# Patient Record
Sex: MALE | Race: WHITE | NOT HISPANIC OR LATINO | Employment: OTHER | URBAN - NONMETROPOLITAN AREA
[De-identification: names, ages, dates, MRNs, and addresses within clinical notes are randomized per-mention and may not be internally consistent; named-entity substitution may affect disease eponyms.]

---

## 2021-01-21 ENCOUNTER — TRANSCRIBE ORDERS (OUTPATIENT)
Dept: ADMINISTRATIVE | Facility: HOSPITAL | Age: 71
End: 2021-01-21

## 2021-01-21 DIAGNOSIS — Z85.72 PERSONAL HISTORY OF LYMPHOMA: Primary | ICD-10-CM

## 2021-01-22 ENCOUNTER — HOSPITAL ENCOUNTER (OUTPATIENT)
Dept: CT IMAGING | Facility: HOSPITAL | Age: 71
Discharge: HOME | End: 2021-01-22
Admitting: PHYSICIAN ASSISTANT

## 2021-01-22 DIAGNOSIS — Z85.72 PERSONAL HISTORY OF LYMPHOMA: ICD-10-CM

## 2021-01-22 LAB — CREAT BLDA-MCNC: 0.7 MG/DL (ref 0.6–1.3)

## 2021-01-22 PROCEDURE — 25010000002 IOPAMIDOL 61 % SOLUTION: Performed by: PHYSICIAN ASSISTANT

## 2021-01-22 PROCEDURE — 82565 ASSAY OF CREATININE: CPT

## 2021-01-22 PROCEDURE — 71270 CT THORAX DX C-/C+: CPT | Performed by: RADIOLOGY

## 2021-01-22 PROCEDURE — 71270 CT THORAX DX C-/C+: CPT

## 2021-01-22 RX ADMIN — IOPAMIDOL 80 ML: 612 INJECTION, SOLUTION INTRAVENOUS at 10:54

## 2021-01-28 ENCOUNTER — TRANSCRIBE ORDERS (OUTPATIENT)
Dept: ADMINISTRATIVE | Facility: HOSPITAL | Age: 71
End: 2021-01-28

## 2021-01-28 DIAGNOSIS — K86.89 PANCREATIC MASS: Primary | ICD-10-CM

## 2021-02-01 ENCOUNTER — HOSPITAL ENCOUNTER (OUTPATIENT)
Dept: CT IMAGING | Facility: HOSPITAL | Age: 71
Discharge: HOME | End: 2021-02-01
Admitting: PHYSICIAN ASSISTANT

## 2021-02-01 DIAGNOSIS — K86.89 PANCREATIC MASS: ICD-10-CM

## 2021-02-01 PROCEDURE — 74178 CT ABD&PLV WO CNTR FLWD CNTR: CPT | Performed by: RADIOLOGY

## 2021-02-01 PROCEDURE — 25010000002 IOPAMIDOL 61 % SOLUTION: Performed by: PHYSICIAN ASSISTANT

## 2021-02-01 PROCEDURE — 74178 CT ABD&PLV WO CNTR FLWD CNTR: CPT

## 2021-02-01 RX ADMIN — IOPAMIDOL 100 ML: 612 INJECTION, SOLUTION INTRAVENOUS at 12:55

## 2021-10-19 ENCOUNTER — TRANSCRIBE ORDERS (OUTPATIENT)
Dept: ADMINISTRATIVE | Facility: HOSPITAL | Age: 71
End: 2021-10-19

## 2021-10-19 DIAGNOSIS — Z01.818 OTHER SPECIFIED PRE-OPERATIVE EXAMINATION: Primary | ICD-10-CM

## 2021-10-22 ENCOUNTER — LAB (OUTPATIENT)
Dept: LAB | Facility: HOSPITAL | Age: 71
End: 2021-10-22

## 2021-10-22 DIAGNOSIS — Z01.818 OTHER SPECIFIED PRE-OPERATIVE EXAMINATION: ICD-10-CM

## 2021-10-22 LAB — SARS-COV-2 RNA PNL SPEC NAA+PROBE: NOT DETECTED

## 2021-10-22 PROCEDURE — C9803 HOPD COVID-19 SPEC COLLECT: HCPCS

## 2021-10-22 PROCEDURE — U0004 COV-19 TEST NON-CDC HGH THRU: HCPCS | Performed by: OPHTHALMOLOGY

## 2021-10-22 PROCEDURE — U0005 INFEC AGEN DETEC AMPLI PROBE: HCPCS | Performed by: OPHTHALMOLOGY

## 2021-12-18 ENCOUNTER — APPOINTMENT (OUTPATIENT)
Dept: GENERAL RADIOLOGY | Facility: HOSPITAL | Age: 71
End: 2021-12-18

## 2021-12-18 ENCOUNTER — APPOINTMENT (OUTPATIENT)
Dept: CT IMAGING | Facility: HOSPITAL | Age: 71
End: 2021-12-18

## 2021-12-18 ENCOUNTER — HOSPITAL ENCOUNTER (INPATIENT)
Facility: HOSPITAL | Age: 71
LOS: 3 days | Discharge: HOME | End: 2021-12-22
Attending: STUDENT IN AN ORGANIZED HEALTH CARE EDUCATION/TRAINING PROGRAM | Admitting: INTERNAL MEDICINE

## 2021-12-18 DIAGNOSIS — U07.1 COVID-19: Primary | ICD-10-CM

## 2021-12-18 DIAGNOSIS — R09.02 HYPOXIA: ICD-10-CM

## 2021-12-18 LAB
A-A DO2: 58.6 MMHG (ref 0–300)
A-A DO2: 85.6 MMHG (ref 0–300)
ALBUMIN SERPL-MCNC: 3.6 G/DL (ref 3.5–5.2)
ALBUMIN/GLOB SERPL: 1.2 G/DL
ALP SERPL-CCNC: 77 U/L (ref 39–117)
ALT SERPL W P-5'-P-CCNC: 24 U/L (ref 1–41)
ANION GAP SERPL CALCULATED.3IONS-SCNC: 10.6 MMOL/L (ref 5–15)
ARTERIAL PATENCY WRIST A: ABNORMAL
ARTERIAL PATENCY WRIST A: POSITIVE
AST SERPL-CCNC: 28 U/L (ref 1–40)
ATMOSPHERIC PRESS: 727 MMHG
ATMOSPHERIC PRESS: 727 MMHG
B PARAPERT DNA SPEC QL NAA+PROBE: NOT DETECTED
B PERT DNA SPEC QL NAA+PROBE: NOT DETECTED
BASE EXCESS BLDA CALC-SCNC: 1.3 MMOL/L (ref 0–2)
BASE EXCESS BLDA CALC-SCNC: 1.6 MMOL/L (ref 0–2)
BASOPHILS # BLD AUTO: 0.01 10*3/MM3 (ref 0–0.2)
BASOPHILS NFR BLD AUTO: 0.2 % (ref 0–1.5)
BDY SITE: ABNORMAL
BDY SITE: ABNORMAL
BILIRUB SERPL-MCNC: 0.5 MG/DL (ref 0–1.2)
BODY TEMPERATURE: 0 C
BODY TEMPERATURE: 0 C
BUN SERPL-MCNC: 11 MG/DL (ref 8–23)
BUN/CREAT SERPL: 12.9 (ref 7–25)
C PNEUM DNA NPH QL NAA+NON-PROBE: NOT DETECTED
CALCIUM SPEC-SCNC: 9.2 MG/DL (ref 8.6–10.5)
CHLORIDE SERPL-SCNC: 100 MMOL/L (ref 98–107)
CO2 BLDA-SCNC: 24.6 MMOL/L (ref 22–33)
CO2 BLDA-SCNC: 24.7 MMOL/L (ref 22–33)
CO2 SERPL-SCNC: 24.4 MMOL/L (ref 22–29)
COHGB MFR BLD: <0.2 % (ref 0–5)
COHGB MFR BLD: <0.2 % (ref 0–5)
CREAT SERPL-MCNC: 0.85 MG/DL (ref 0.76–1.27)
CRP SERPL-MCNC: 5.48 MG/DL (ref 0–0.5)
D DIMER PPP FEU-MCNC: 1.72 MCGFEU/ML (ref 0–0.5)
D-LACTATE SERPL-SCNC: 1.2 MMOL/L (ref 0.5–2)
DEPRECATED RDW RBC AUTO: 46.9 FL (ref 37–54)
EOSINOPHIL # BLD AUTO: 0.01 10*3/MM3 (ref 0–0.4)
EOSINOPHIL NFR BLD AUTO: 0.2 % (ref 0.3–6.2)
ERYTHROCYTE [DISTWIDTH] IN BLOOD BY AUTOMATED COUNT: 14 % (ref 12.3–15.4)
FERRITIN SERPL-MCNC: 2389 NG/ML (ref 30–400)
FLUAV SUBTYP SPEC NAA+PROBE: NOT DETECTED
FLUBV RNA ISLT QL NAA+PROBE: NOT DETECTED
GAS FLOW AIRWAY: 2 LPM
GFR SERPL CREATININE-BSD FRML MDRD: 89 ML/MIN/1.73
GLOBULIN UR ELPH-MCNC: 3.1 GM/DL
GLUCOSE SERPL-MCNC: 134 MG/DL (ref 65–99)
HADV DNA SPEC NAA+PROBE: NOT DETECTED
HCO3 BLDA-SCNC: 23.6 MMOL/L (ref 20–26)
HCO3 BLDA-SCNC: 23.8 MMOL/L (ref 20–26)
HCOV 229E RNA SPEC QL NAA+PROBE: NOT DETECTED
HCOV HKU1 RNA SPEC QL NAA+PROBE: NOT DETECTED
HCOV NL63 RNA SPEC QL NAA+PROBE: NOT DETECTED
HCOV OC43 RNA SPEC QL NAA+PROBE: NOT DETECTED
HCT VFR BLD AUTO: 44.1 % (ref 37.5–51)
HCT VFR BLD CALC: 43.4 % (ref 38–51)
HCT VFR BLD CALC: 44.6 % (ref 38–51)
HGB BLD-MCNC: 14.3 G/DL (ref 13–17.7)
HGB BLDA-MCNC: 14.2 G/DL (ref 14–18)
HGB BLDA-MCNC: 14.6 G/DL (ref 14–18)
HMPV RNA NPH QL NAA+NON-PROBE: NOT DETECTED
HOLD SPECIMEN: NORMAL
HOLD SPECIMEN: NORMAL
HPIV1 RNA ISLT QL NAA+PROBE: NOT DETECTED
HPIV2 RNA SPEC QL NAA+PROBE: NOT DETECTED
HPIV3 RNA NPH QL NAA+PROBE: NOT DETECTED
HPIV4 P GENE NPH QL NAA+PROBE: NOT DETECTED
IMM GRANULOCYTES # BLD AUTO: 0.01 10*3/MM3 (ref 0–0.05)
IMM GRANULOCYTES NFR BLD AUTO: 0.2 % (ref 0–0.5)
INHALED O2 CONCENTRATION: 21 %
INHALED O2 CONCENTRATION: 28 %
LDH SERPL-CCNC: 308 U/L (ref 135–225)
LYMPHOCYTES # BLD AUTO: 0.89 10*3/MM3 (ref 0.7–3.1)
LYMPHOCYTES NFR BLD AUTO: 21.4 % (ref 19.6–45.3)
Lab: ABNORMAL
M PNEUMO IGG SER IA-ACNC: NOT DETECTED
MCH RBC QN AUTO: 29.5 PG (ref 26.6–33)
MCHC RBC AUTO-ENTMCNC: 32.4 G/DL (ref 31.5–35.7)
MCV RBC AUTO: 90.9 FL (ref 79–97)
METHGB BLD QL: 0 % (ref 0–3)
METHGB BLD QL: 0.1 % (ref 0–3)
MODALITY: ABNORMAL
MODALITY: ABNORMAL
MONOCYTES # BLD AUTO: 0.4 10*3/MM3 (ref 0.1–0.9)
MONOCYTES NFR BLD AUTO: 9.6 % (ref 5–12)
NEUTROPHILS NFR BLD AUTO: 2.83 10*3/MM3 (ref 1.7–7)
NEUTROPHILS NFR BLD AUTO: 68.4 % (ref 42.7–76)
NOTE: ABNORMAL
NOTE: ABNORMAL
NOTIFIED BY: ABNORMAL
NOTIFIED WHO: ABNORMAL
NRBC BLD AUTO-RTO: 0 /100 WBC (ref 0–0.2)
NT-PROBNP SERPL-MCNC: 587 PG/ML (ref 0–900)
OXYHGB MFR BLDV: 86.3 % (ref 94–99)
OXYHGB MFR BLDV: 93.1 % (ref 94–99)
PCO2 BLDA: 30 MM HG (ref 35–45)
PCO2 BLDA: 30.4 MM HG (ref 35–45)
PCO2 TEMP ADJ BLD: ABNORMAL MM[HG]
PCO2 TEMP ADJ BLD: ABNORMAL MM[HG]
PH BLDA: 7.5 PH UNITS (ref 7.35–7.45)
PH BLDA: 7.51 PH UNITS (ref 7.35–7.45)
PH, TEMP CORRECTED: ABNORMAL
PH, TEMP CORRECTED: ABNORMAL
PLATELET # BLD AUTO: 143 10*3/MM3 (ref 140–450)
PMV BLD AUTO: 9.6 FL (ref 6–12)
PO2 BLDA: 49.8 MM HG (ref 83–108)
PO2 BLDA: 71.4 MM HG (ref 83–108)
PO2 TEMP ADJ BLD: ABNORMAL MM[HG]
PO2 TEMP ADJ BLD: ABNORMAL MM[HG]
POTASSIUM SERPL-SCNC: 4.1 MMOL/L (ref 3.5–5.2)
PROT SERPL-MCNC: 6.7 G/DL (ref 6–8.5)
RBC # BLD AUTO: 4.85 10*6/MM3 (ref 4.14–5.8)
RHINOVIRUS RNA SPEC NAA+PROBE: NOT DETECTED
RSV RNA NPH QL NAA+NON-PROBE: NOT DETECTED
SAO2 % BLDCOA: 86 % (ref 94–99)
SAO2 % BLDCOA: 92.8 % (ref 94–99)
SARS-COV-2 RNA NPH QL NAA+NON-PROBE: DETECTED
SODIUM SERPL-SCNC: 135 MMOL/L (ref 136–145)
TROPONIN T SERPL-MCNC: <0.01 NG/ML (ref 0–0.03)
VENTILATOR MODE: ABNORMAL
VENTILATOR MODE: ABNORMAL
WBC NRBC COR # BLD: 4.15 10*3/MM3 (ref 3.4–10.8)
WHOLE BLOOD HOLD SPECIMEN: NORMAL
WHOLE BLOOD HOLD SPECIMEN: NORMAL

## 2021-12-18 PROCEDURE — 36600 WITHDRAWAL OF ARTERIAL BLOOD: CPT

## 2021-12-18 PROCEDURE — 25010000002 DEXAMETHASONE PER 1 MG: Performed by: NURSE PRACTITIONER

## 2021-12-18 PROCEDURE — 82728 ASSAY OF FERRITIN: CPT | Performed by: PHYSICIAN ASSISTANT

## 2021-12-18 PROCEDURE — 36415 COLL VENOUS BLD VENIPUNCTURE: CPT

## 2021-12-18 PROCEDURE — 87040 BLOOD CULTURE FOR BACTERIA: CPT | Performed by: PHYSICIAN ASSISTANT

## 2021-12-18 PROCEDURE — 85025 COMPLETE CBC W/AUTO DIFF WBC: CPT | Performed by: PHYSICIAN ASSISTANT

## 2021-12-18 PROCEDURE — 71275 CT ANGIOGRAPHY CHEST: CPT

## 2021-12-18 PROCEDURE — 80053 COMPREHEN METABOLIC PANEL: CPT | Performed by: PHYSICIAN ASSISTANT

## 2021-12-18 PROCEDURE — 83050 HGB METHEMOGLOBIN QUAN: CPT

## 2021-12-18 PROCEDURE — 82805 BLOOD GASES W/O2 SATURATION: CPT

## 2021-12-18 PROCEDURE — 93005 ELECTROCARDIOGRAM TRACING: CPT | Performed by: PHYSICIAN ASSISTANT

## 2021-12-18 PROCEDURE — 71045 X-RAY EXAM CHEST 1 VIEW: CPT

## 2021-12-18 PROCEDURE — 0 IOVERSOL 68 % SOLUTION: Performed by: EMERGENCY MEDICINE

## 2021-12-18 PROCEDURE — 84145 PROCALCITONIN (PCT): CPT | Performed by: PHYSICIAN ASSISTANT

## 2021-12-18 PROCEDURE — 85379 FIBRIN DEGRADATION QUANT: CPT | Performed by: PHYSICIAN ASSISTANT

## 2021-12-18 PROCEDURE — 82375 ASSAY CARBOXYHB QUANT: CPT

## 2021-12-18 PROCEDURE — 99284 EMERGENCY DEPT VISIT MOD MDM: CPT

## 2021-12-18 PROCEDURE — 83880 ASSAY OF NATRIURETIC PEPTIDE: CPT | Performed by: PHYSICIAN ASSISTANT

## 2021-12-18 PROCEDURE — 83615 LACTATE (LD) (LDH) ENZYME: CPT | Performed by: PHYSICIAN ASSISTANT

## 2021-12-18 PROCEDURE — 93010 ELECTROCARDIOGRAM REPORT: CPT | Performed by: SPECIALIST

## 2021-12-18 PROCEDURE — 86140 C-REACTIVE PROTEIN: CPT | Performed by: PHYSICIAN ASSISTANT

## 2021-12-18 PROCEDURE — 0202U NFCT DS 22 TRGT SARS-COV-2: CPT | Performed by: PHYSICIAN ASSISTANT

## 2021-12-18 PROCEDURE — 84484 ASSAY OF TROPONIN QUANT: CPT | Performed by: PHYSICIAN ASSISTANT

## 2021-12-18 PROCEDURE — 83036 HEMOGLOBIN GLYCOSYLATED A1C: CPT | Performed by: INTERNAL MEDICINE

## 2021-12-18 PROCEDURE — 84443 ASSAY THYROID STIM HORMONE: CPT | Performed by: INTERNAL MEDICINE

## 2021-12-18 PROCEDURE — 83605 ASSAY OF LACTIC ACID: CPT | Performed by: PHYSICIAN ASSISTANT

## 2021-12-18 RX ORDER — DEXAMETHASONE SODIUM PHOSPHATE 10 MG/ML
10 INJECTION INTRAMUSCULAR; INTRAVENOUS ONCE
Status: COMPLETED | OUTPATIENT
Start: 2021-12-18 | End: 2021-12-18

## 2021-12-18 RX ADMIN — IOVERSOL 70 ML: 678 INJECTION INTRA-ARTERIAL; INTRAVENOUS at 21:49

## 2021-12-18 RX ADMIN — DEXAMETHASONE SODIUM PHOSPHATE 10 MG: 10 INJECTION INTRAMUSCULAR; INTRAVENOUS at 21:22

## 2021-12-19 PROBLEM — U07.1 COVID-19: Status: ACTIVE | Noted: 2021-12-19

## 2021-12-19 LAB
ALBUMIN SERPL-MCNC: 3.2 G/DL (ref 3.5–5.2)
ALP SERPL-CCNC: 72 U/L (ref 39–117)
ALT SERPL W P-5'-P-CCNC: 18 U/L (ref 1–41)
AST SERPL-CCNC: 22 U/L (ref 1–40)
BILIRUB CONJ SERPL-MCNC: 0.2 MG/DL (ref 0–0.3)
BILIRUB INDIRECT SERPL-MCNC: 0.2 MG/DL
BILIRUB SERPL-MCNC: 0.4 MG/DL (ref 0–1.2)
CREAT SERPL-MCNC: 0.88 MG/DL (ref 0.76–1.27)
GFR SERPL CREATININE-BSD FRML MDRD: 85 ML/MIN/1.73
GLUCOSE BLDC GLUCOMTR-MCNC: 216 MG/DL (ref 70–130)
GLUCOSE BLDC GLUCOMTR-MCNC: 222 MG/DL (ref 70–130)
GLUCOSE BLDC GLUCOMTR-MCNC: 230 MG/DL (ref 70–130)
HBA1C MFR BLD: 6.7 % (ref 4.8–5.6)
PROCALCITONIN SERPL-MCNC: 0.09 NG/ML (ref 0–0.25)
PROT SERPL-MCNC: 6.2 G/DL (ref 6–8.5)
QT INTERVAL: 392 MS
QTC INTERVAL: 455 MS
TSH SERPL DL<=0.05 MIU/L-ACNC: 1.97 UIU/ML (ref 0.27–4.2)

## 2021-12-19 PROCEDURE — 82565 ASSAY OF CREATININE: CPT | Performed by: INTERNAL MEDICINE

## 2021-12-19 PROCEDURE — 25010000002 ENOXAPARIN PER 10 MG: Performed by: INTERNAL MEDICINE

## 2021-12-19 PROCEDURE — 80076 HEPATIC FUNCTION PANEL: CPT | Performed by: INTERNAL MEDICINE

## 2021-12-19 PROCEDURE — 82962 GLUCOSE BLOOD TEST: CPT

## 2021-12-19 PROCEDURE — 63710000001 DEXAMETHASONE PER 0.25 MG: Performed by: INTERNAL MEDICINE

## 2021-12-19 PROCEDURE — 99223 1ST HOSP IP/OBS HIGH 75: CPT | Performed by: INTERNAL MEDICINE

## 2021-12-19 PROCEDURE — XW033E5 INTRODUCTION OF REMDESIVIR ANTI-INFECTIVE INTO PERIPHERAL VEIN, PERCUTANEOUS APPROACH, NEW TECHNOLOGY GROUP 5: ICD-10-PCS | Performed by: INTERNAL MEDICINE

## 2021-12-19 RX ORDER — SODIUM CHLORIDE 0.9 % (FLUSH) 0.9 %
3-10 SYRINGE (ML) INJECTION AS NEEDED
Status: DISCONTINUED | OUTPATIENT
Start: 2021-12-19 | End: 2021-12-22 | Stop reason: HOSPADM

## 2021-12-19 RX ORDER — NIFEDIPINE 60 MG/1
60 TABLET, FILM COATED, EXTENDED RELEASE ORAL DAILY
COMMUNITY

## 2021-12-19 RX ORDER — ALPRAZOLAM 1 MG/1
1 TABLET ORAL 3 TIMES DAILY PRN
Status: DISCONTINUED | OUTPATIENT
Start: 2021-12-19 | End: 2021-12-22 | Stop reason: HOSPADM

## 2021-12-19 RX ORDER — EZETIMIBE 10 MG/1
10 TABLET ORAL DAILY
COMMUNITY

## 2021-12-19 RX ORDER — SODIUM CHLORIDE 0.9 % (FLUSH) 0.9 %
3 SYRINGE (ML) INJECTION EVERY 12 HOURS SCHEDULED
Status: DISCONTINUED | OUTPATIENT
Start: 2021-12-19 | End: 2021-12-22 | Stop reason: HOSPADM

## 2021-12-19 RX ORDER — ATENOLOL 25 MG/1
25 TABLET ORAL DAILY
COMMUNITY

## 2021-12-19 RX ORDER — ASPIRIN 81 MG/1
81 TABLET ORAL DAILY
Status: DISCONTINUED | OUTPATIENT
Start: 2021-12-19 | End: 2021-12-22 | Stop reason: HOSPADM

## 2021-12-19 RX ORDER — DEXAMETHASONE 4 MG/1
6 TABLET ORAL DAILY
Status: DISCONTINUED | OUTPATIENT
Start: 2021-12-19 | End: 2021-12-22 | Stop reason: HOSPADM

## 2021-12-19 RX ORDER — PANTOPRAZOLE SODIUM 40 MG/1
40 TABLET, DELAYED RELEASE ORAL 2 TIMES DAILY
COMMUNITY

## 2021-12-19 RX ORDER — DEXAMETHASONE SODIUM PHOSPHATE 4 MG/ML
6 INJECTION, SOLUTION INTRA-ARTICULAR; INTRALESIONAL; INTRAMUSCULAR; INTRAVENOUS; SOFT TISSUE DAILY
Status: DISCONTINUED | OUTPATIENT
Start: 2021-12-19 | End: 2021-12-22 | Stop reason: HOSPADM

## 2021-12-19 RX ORDER — ASPIRIN 81 MG/1
81 TABLET ORAL DAILY
COMMUNITY

## 2021-12-19 RX ORDER — ATENOLOL 25 MG/1
25 TABLET ORAL DAILY
Status: DISCONTINUED | OUTPATIENT
Start: 2021-12-19 | End: 2021-12-22 | Stop reason: HOSPADM

## 2021-12-19 RX ORDER — AZITHROMYCIN 250 MG/1
250 TABLET, FILM COATED ORAL DAILY
COMMUNITY
End: 2021-12-22 | Stop reason: HOSPADM

## 2021-12-19 RX ORDER — NICOTINE POLACRILEX 4 MG
15 LOZENGE BUCCAL
Status: DISCONTINUED | OUTPATIENT
Start: 2021-12-19 | End: 2021-12-22 | Stop reason: HOSPADM

## 2021-12-19 RX ORDER — NITROGLYCERIN 0.4 MG/1
0.4 TABLET SUBLINGUAL
Status: DISCONTINUED | OUTPATIENT
Start: 2021-12-19 | End: 2021-12-22 | Stop reason: HOSPADM

## 2021-12-19 RX ORDER — PANTOPRAZOLE SODIUM 40 MG/1
40 TABLET, DELAYED RELEASE ORAL 2 TIMES DAILY
Status: DISCONTINUED | OUTPATIENT
Start: 2021-12-19 | End: 2021-12-22 | Stop reason: HOSPADM

## 2021-12-19 RX ORDER — AZITHROMYCIN 250 MG/1
250 TABLET, FILM COATED ORAL DAILY
Status: CANCELLED | OUTPATIENT
Start: 2021-12-19 | End: 2021-12-21

## 2021-12-19 RX ORDER — ONDANSETRON 4 MG/1
4 TABLET, FILM COATED ORAL 3 TIMES DAILY PRN
Status: DISCONTINUED | OUTPATIENT
Start: 2021-12-19 | End: 2021-12-22 | Stop reason: HOSPADM

## 2021-12-19 RX ORDER — ONDANSETRON 4 MG/1
4 TABLET, FILM COATED ORAL 3 TIMES DAILY PRN
COMMUNITY
End: 2021-12-22 | Stop reason: HOSPADM

## 2021-12-19 RX ORDER — DEXTROSE MONOHYDRATE 25 G/50ML
25 INJECTION, SOLUTION INTRAVENOUS
Status: DISCONTINUED | OUTPATIENT
Start: 2021-12-19 | End: 2021-12-22 | Stop reason: HOSPADM

## 2021-12-19 RX ORDER — NIFEDIPINE 60 MG/1
60 TABLET, FILM COATED, EXTENDED RELEASE ORAL DAILY
Status: DISCONTINUED | OUTPATIENT
Start: 2021-12-19 | End: 2021-12-22 | Stop reason: HOSPADM

## 2021-12-19 RX ADMIN — PANTOPRAZOLE SODIUM 40 MG: 40 TABLET, DELAYED RELEASE ORAL at 20:38

## 2021-12-19 RX ADMIN — ENOXAPARIN SODIUM 40 MG: 40 INJECTION SUBCUTANEOUS at 09:56

## 2021-12-19 RX ADMIN — ASPIRIN 81 MG: 81 TABLET, COATED ORAL at 09:08

## 2021-12-19 RX ADMIN — ALPRAZOLAM 1 MG: 1 TABLET ORAL at 23:44

## 2021-12-19 RX ADMIN — NIFEDIPINE 60 MG: 60 TABLET, EXTENDED RELEASE ORAL at 09:08

## 2021-12-19 RX ADMIN — ALPRAZOLAM 1 MG: 1 TABLET ORAL at 16:00

## 2021-12-19 RX ADMIN — DEXAMETHASONE 6 MG: 4 TABLET ORAL at 09:08

## 2021-12-19 RX ADMIN — ATENOLOL 25 MG: 25 TABLET ORAL at 09:08

## 2021-12-19 RX ADMIN — SODIUM CHLORIDE, PRESERVATIVE FREE 3 ML: 5 INJECTION INTRAVENOUS at 21:36

## 2021-12-19 RX ADMIN — REMDESIVIR 200 MG: 100 INJECTION, POWDER, LYOPHILIZED, FOR SOLUTION INTRAVENOUS at 05:37

## 2021-12-19 RX ADMIN — SODIUM CHLORIDE, PRESERVATIVE FREE 3 ML: 5 INJECTION INTRAVENOUS at 09:08

## 2021-12-19 RX ADMIN — PANTOPRAZOLE SODIUM 40 MG: 40 TABLET, DELAYED RELEASE ORAL at 09:08

## 2021-12-19 RX ADMIN — SODIUM CHLORIDE, PRESERVATIVE FREE 3 ML: 5 INJECTION INTRAVENOUS at 02:19

## 2021-12-19 NOTE — PLAN OF CARE
Goal Outcome Evaluation:      Patient was a new admit from the ER this AM. He denies any chest pain or shortness of breath. Vital signs are stable. No s/s of acute stress noted. Will continue to monitor and follow plan of care.

## 2021-12-19 NOTE — ED NOTES
MEDICAL SCREENING:    Reason for Visit: sob, cough  Patient initially seen in triage.  The patient was advised further evaluation and diagnostic testing will be needed, some of the treatment and testing will be initiated in the lobby in order to begin the process.  The patient will be returned to the waiting area for the time being and possibly be re-assessed by a subsequent ED provider.  The patient will be brought back to the treatment area in as timely manner as possible.         Joseluis Alvarez PA  12/18/21 1942

## 2021-12-19 NOTE — ED PROVIDER NOTES
Subjective   71-year-old white male presents secondary to a several day history of worsening shortness of breath cough congestion and feeling poorly. No known exposure to Covid. No nausea vomiting diarrhea. He states that he has had persistent tightness in his chest over the past few days. He denies any lower extremity swelling or pain. He voices no other complaints this time. Patient is noted to be hypoxic on ABG. He was immediately brought to the room after being placed on oxygen.          Review of Systems   Constitutional: Negative.  Negative for fever.   HENT: Negative.    Respiratory: Positive for shortness of breath.    Cardiovascular: Negative.  Negative for chest pain.   Gastrointestinal: Negative.  Negative for abdominal pain.   Endocrine: Negative.    Genitourinary: Negative.  Negative for dysuria.   Skin: Negative.    Neurological: Negative.    Psychiatric/Behavioral: Negative.    All other systems reviewed and are negative.      Past Medical History:   Diagnosis Date   • Cancer (HCC)     stomach   • Diabetes mellitus (HCC)    • Elevated cholesterol    • GERD (gastroesophageal reflux disease)    • Hypertension        Allergies   Allergen Reactions   • Morphine Unknown - Low Severity       Past Surgical History:   Procedure Laterality Date   • EYE SURGERY      cataract   • LUNG BIOPSY  2004       History reviewed. No pertinent family history.    Social History     Socioeconomic History   • Marital status:    Tobacco Use   • Smoking status: Former Smoker     Types: Cigarettes     Start date: 1980     Quit date: 1981     Years since quittin.0   • Smokeless tobacco: Never Used   Vaping Use   • Vaping Use: Never used   Substance and Sexual Activity   • Alcohol use: Never   • Drug use: Never           Objective   Physical Exam  Vitals and nursing note reviewed.   Constitutional:       General: He is not in acute distress.     Appearance: He is well-developed. He is not diaphoretic.    HENT:      Head: Normocephalic and atraumatic.      Right Ear: External ear normal.      Left Ear: External ear normal.      Nose: Nose normal.   Eyes:      Conjunctiva/sclera: Conjunctivae normal.      Pupils: Pupils are equal, round, and reactive to light.   Neck:      Vascular: No JVD.      Trachea: No tracheal deviation.   Cardiovascular:      Rate and Rhythm: Normal rate and regular rhythm.      Heart sounds: Normal heart sounds. No murmur heard.      Pulmonary:      Effort: Pulmonary effort is normal. No respiratory distress.      Breath sounds: Normal breath sounds. No wheezing.   Abdominal:      General: Bowel sounds are normal.      Palpations: Abdomen is soft.      Tenderness: There is no abdominal tenderness.   Musculoskeletal:         General: No deformity. Normal range of motion.      Cervical back: Normal range of motion and neck supple.   Skin:     General: Skin is warm and dry.      Coloration: Skin is not pale.      Findings: No erythema or rash.   Neurological:      Mental Status: He is alert and oriented to person, place, and time.      Cranial Nerves: No cranial nerve deficit.   Psychiatric:         Behavior: Behavior normal.         Thought Content: Thought content normal.         Procedures           ED Course  ED Course as of 12/19/21 0514   Sat Dec 18, 2021   2007 Signed out to Wanda Rosenbaum [JI]   2058    IMPRESSION:  Faint patchy pulmonary opacities scattered within the mid and lower lungs concerning for mild multifocal pulmonary infiltrate.     Signer Name: Oscar Whaley MD   Signed: 12/18/2021 8:43 PM   Workstation Name: [email or web address]    [facility] []   4664 CT Chest Pulmonary Embolism     IMPRESSION:  1.  No evidence of pulmonary embolism or other acute vascular abnormality.  2.  Extensive mild diffuse groundglass pulmonary opacities in a pattern typical for COVID-19 pneumonia.  3.  Pulmonary emphysema.  4.  Tiny right pleural effusion.     Signer  Name: Oscar Whaley MD   Signed: 12/18/2021 10:05 PM   Workstation Name: [facility]-    [facility] []   [id number] Paged Dr. Zapien  []   [id number] Discussed patient with Dr. Zapien at this time. Agrees to admit patient at this time.  []   Sun Dec 19, 2021   0550 ECG 12 Lead  Normal sinus rhythm  Low voltage QRS  Cannot rule out Anterior infarct , age undetermined  Abnormal ECG  No previous ECGs available  Vent. rate 81 BPM  MO interval 132 ms  QRS duration 70 ms  QT/QTcB 392/455 ms  P-R-T axes 27 -6 26 [ES]      ED Course User Index  [ES] Chivo Mitchell MD  [] Joseluis Alvarez PA  [] Wanda Rosenbaum, APRN                                                 MDM  Number of Diagnoses or Management Options  COVID-19: new and requires workup  Hypoxia: new and requires workup     Amount and/or Complexity of Data Reviewed  Clinical lab tests: reviewed and ordered  Tests in the radiology section of CPT®: ordered and reviewed  Tests in the medicine section of CPT®: reviewed and ordered  Review and summarize past medical records: yes  Discuss the patient with other providers: yes  Independent visualization of images, tracings, or specimens: yes    Risk of Complications, Morbidity, and/or Mortality  Presenting problems: moderate  Diagnostic procedures: moderate  Management options: moderate    Patient Progress  Patient progress: stable      Final diagnoses:   COVID-19   Hypoxia       ED Disposition  ED Disposition     ED Disposition Condition Comment    Decision to Admit  Level of Care: Medical Telemetry [23]   Diagnosis: COVID-19 [[id number]]   Certification: I Certify That Inpatient Hospital Services Are Medically Necessary For Greater Than 2 Midnights            No follow-up provider specified.       Medication List      No changes were made to your prescriptions during this visit.          Chivo Mitchell MD  12/19/21 0514

## 2021-12-19 NOTE — PROGRESS NOTES
Interim hospitalist note    Patient seen and examined via video conferencing equipment with patient's nurse present.  Patient states he feels much improved compared to how he felt during admission.  He reports shortness of breath has improved.  He denies any nausea, vomiting, abdominal pain, fever, chills or any other symptoms at this time.  Did discuss treatment plan consisting of using Decadron and remdesivir.  Patient was in agreements to this plan.  Per nursing staff, no new events since admission.

## 2021-12-19 NOTE — H&P
[facility]    Patient Identification:  Name:  Drake Brandon  Age:  71 y.o.  Sex:  male  :  1950  MRN:  [id number]   Visit Number:  [id number]  Admit Date: 2021   Room number:  3311/1S  Primary Care Physician:  Leland Sinha PA    Date of Admission: 2021     Subjective     Chief complaint:    Chief Complaint   Patient presents with   • Shortness of Breath   • Exposure To Known Illness     History of presenting illness:  71 y.o. male who was admitted on 2021 from the ED with shortness of air. The patient has a past medical history non-Hodgkin's lymphoma, which has been treated twice.  The last time he was treated for non-Hodgkin's lymphoma was about 1 year prior to this admission.  Patient states that he underwent cholecystectomy and was found to have some acute pancreatitis.  Endoscopy showed an ulcer in his stomach, with biopsy demonstrating non-Hodgkin's lymphoma.  The patient states that he received radiation at that time with no chemotherapy.  Patient also has a history of type 2 diabetes mellitus and essential hypertension.  [facility] [place]  The patient has been sick for about 1.5 weeks with symptoms of a productive cough, fever, fatigue, appetite change, chills, sweating.  He saw his primary care provider on day 7 of illness; at that time, COVID-19 testing was negative.  He was given 3 days of azithromycin.  The patient has been monitoring his oxygen levels at home; on the day of 2021, his saturations were noted to be 85 to 88%.  The patient then went to see his primary care provider on 2021; the patient was encouraged to be admitted to [facility] but the patient declined transfer and wanted to come closer to a facility that provided cancer care; when asked who [facility].  In the emergency department, the patient was found to have  bilateral patchy infiltrates on CT scan.  On room air, his PaO2 was 49.8%.  Therefore, the patient was admitted to the telemetry floor for further evaluation and treatment.  ---------------------------------------------------------------------------------------------------------------------   Review of Systems   Constitutional: Positive for appetite change, chills, diaphoresis, fatigue and fever.   HENT: Negative for congestion and rhinorrhea.         Partial loss of taste   Eyes: Negative for discharge and redness.   Respiratory: Positive for cough (Productive of thick, green/yellow/brown sputum intermittently). Negative for shortness of breath.    Cardiovascular: Negative for chest pain and leg swelling.   Gastrointestinal: Positive for diarrhea. Negative for nausea and vomiting.   Genitourinary: Negative for dysuria and hematuria.   Musculoskeletal: Negative for arthralgias.   Skin: Positive for rash (Red, itching, bumps on his bilateral arms that is now resolved). Negative for pallor and wound.   Neurological: Negative for syncope, facial asymmetry, speech difficulty, weakness, light-headedness and headaches.   Psychiatric/Behavioral: Negative for agitation, behavioral problems and confusion.     ---------------------------------------------------------------------------------------------------------------------   Past Medical History:   Diagnosis Date   • Diabetes mellitus (HCC)    • Elevated cholesterol    • GERD (gastroesophageal reflux disease)    • Hypertension    • Non Hodgkin's lymphoma (HCC)     In remission     Past Surgical History:   Procedure Laterality Date   • EYE SURGERY      cataract   • LUNG BIOPSY  2004     Family History   Problem Relation Age of Onset   • Diabetes Mother    • Cancer Father      Social History     Socioeconomic History   • Marital status:    Tobacco Use   • Smoking status: Former Smoker     Types: Cigarettes     Start date: 12/19/1980     Quit date: 12/19/1981     Years  since quittin.0   • Smokeless tobacco: Never Used   Vaping Use   • Vaping Use: Never used   Substance and Sexual Activity   • Alcohol use: Never   • Drug use: Never   • Sexual activity: Defer     ---------------------------------------------------------------------------------------------------------------------   Allergies:  Morphine  ---------------------------------------------------------------------------------------------------------------------   Medications below are reported home medications pulling from within the system; at this time, these medications have not been reconciled unless otherwise specified and are in the verification process for further verifcation as current home medications.      Prior to Admission Medications     None        Objective     Vital Signs:  Temp:  [97.3 °F (36.3 °C)-97.8 °F (36.6 °C)] 97.3 °F (36.3 °C)  Heart Rate:  [79-90] 79  Resp:  [18] 18  BP: (115-162)/(58-67) 162/58    Mean Arterial Pressure (Non-Invasive) for the past 24 hrs (Last 3 readings):   Noninvasive MAP (mmHg)   21     SpO2:  [88 %-95 %] 95 %  on  Flow (L/min):  [2] 2;   Device (Oxygen Therapy): nasal cannula  Body mass index is 27.57 kg/m².    Wt Readings from Last 3 Encounters:   21 79.8 kg (176 lb)      ----------------------------------------------------------------------------------------------------------------------  Physical Exam  Vitals reviewed.   Constitutional:       General: He is in acute distress.      Appearance: Normal appearance. He is well-developed and overweight. He is not ill-appearing, toxic-appearing or diaphoretic.      Comments: Wearing 2 L/min of nasal cannula oxygen.   HENT:      Head: Normocephalic and atraumatic.      Right Ear: External ear normal.      Left Ear: External ear normal.   Eyes:      General: No scleral icterus.        Right eye: No discharge.         Left eye: No discharge.      Pupils: Pupils are equal, round, and reactive to light.    Cardiovascular:      Rate and Rhythm: Normal rate and regular rhythm.      Pulses: Normal pulses.      Heart sounds: No murmur heard.      Pulmonary:      Effort: Respiratory distress present. No accessory muscle usage or prolonged expiration.      Breath sounds: Decreased breath sounds present. No wheezing or rales.   Abdominal:      General: Bowel sounds are normal. There is no distension.      Palpations: Abdomen is soft.   Musculoskeletal:         General: No swelling, deformity or signs of injury.   Skin:     Capillary Refill: Capillary refill takes less than 2 seconds.      Coloration: Skin is not jaundiced.      Findings: No bruising or rash.   Neurological:      Mental Status: He is alert and oriented to person, place, and time. Mental status is at baseline.      Cranial Nerves: No cranial nerve deficit.   Psychiatric:         Mood and Affect: Mood normal.         Behavior: Behavior normal. Behavior is cooperative.         Thought Content: Thought content normal.         Judgment: Judgment normal.       ---------------------------------------------------------------------------------------------------------------------  EKG: Normal sinus rhythm with a heart rate 81 and a  ms. There is low voltage on EKG but there are no ischemic changes. Please note that there are no comparison EKGs in our computer system nor and care everywhere. Please note that I have personally looked at the EKG from this admission and the above is my interpretation of this admission's EKG; I await the final cardiology read.      Telemetry: Normal sinus rhythm with heart rate 70s to 80s. Please note that I personally looked at the telemetry strips.  --------------------------------------------------------------------------------------------------------------------  LABS:    CBC and coagulation:  Results from last 7 days   Lab Units 12/18/21 2004   LACTATE mmol/L 1.2   CRP mg/dL 5.48*   WBC 10*3/mm3 4.15   HEMOGLOBIN g/dL 14.3    HEMATOCRIT % 44.1   MCV fL 90.9   MCHC g/dL 32.4   PLATELETS 10*3/mm3 143   D DIMER QUANT MCGFEU/mL 1.72*     Acid/base balance:  Results from last 7 days   Lab Units 12/18/21 2113 12/18/21 1917   PH, ARTERIAL pH units 7.508* 7.499*   PO2 ART mm Hg 71.4* 49.8*   PCO2, ARTERIAL mm Hg 30.0* 30.4*   HCO3 ART mmol/L 23.8 23.6     Renal and electrolytes:  Results from last 7 days   Lab Units 12/19/21  0548 12/18/21 2004   SODIUM mmol/L  --  135*   POTASSIUM mmol/L  --  4.1   CHLORIDE mmol/L  --  100   CO2 mmol/L  --  24.4   BUN mg/dL  --  11   CREATININE mg/dL 0.88 0.85   EGFR IF NONAFRICN AM mL/min/1.73 85 89   CALCIUM mg/dL  --  9.2   GLUCOSE mg/dL  --  134*     Estimated Creatinine Clearance: 76.8 mL/min (by C-G formula based on SCr of 0.88 mg/dL).    Liver and pancreatic function:  Results from last 7 days   Lab Units 12/19/21 0548 12/18/21 2004   ALBUMIN g/dL 3.20* 3.60   BILIRUBIN mg/dL 0.4 0.5   ALK PHOS U/L 72 77   AST (SGOT) U/L 22 28   ALT (SGPT) U/L 18 24     Endocrine function:  Lab Results   Component Value Date    HGBA1C 6.70 (H) 12/18/2021     Lab Results   Component Value Date    TSH 1.970 12/18/2021     Cardiac:  Results from last 7 days   Lab Units 12/18/21 2004   TROPONIN T ng/mL <0.010   PROBNP pg/mL 587.0       Cultures:  Microbiology Results (last 10 days)     Procedure Component Value - Date/Time    Respiratory Panel PCR w/COVID-19(SARS-CoV-2) CARLOS/LEI/VESNA/PAD/COR/MAD/JB In-House, NP Swab in [facility] 3-4 HR TAT - Swab, Nasopharynx [[id number]]  (Abnormal) Collected: 12/18/21 2013    Lab Status: Final result Specimen: Swab from Nasopharynx Updated: 12/18/21 2117     ADENOVIRUS, PCR Not Detected     Coronavirus 229E Not Detected     Coronavirus HKU1 Not Detected     Coronavirus NL63 Not Detected     Coronavirus OC43 Not Detected     COVID19 Detected     Human Metapneumovirus Not Detected     Human Rhinovirus/Enterovirus Not Detected     Influenza A PCR Not Detected     Influenza B PCR Not  Detected     Parainfluenza Virus 1 Not Detected     Parainfluenza Virus 2 Not Detected     Parainfluenza Virus 3 Not Detected     Parainfluenza Virus 4 Not Detected     RSV, PCR Not Detected     Bordetella pertussis pcr Not Detected     Bordetella parapertussis PCR Not Detected     Chlamydophila pneumoniae PCR Not Detected     Mycoplasma pneumo by PCR Not Detected    Narrative:      In the setting of a positive respiratory panel with a viral infection PLUS a negative procalcitonin without other underlying concern for bacterial infection, consider observing off antibiotics or discontinuation of antibiotics and continue supportive care. If the respiratory panel is positive for atypical bacterial infection (Bordetella pertussis, Chlamydophila pneumoniae, or Mycoplasma pneumoniae), consider antibiotic de-escalation to target atypical bacterial infection.          I have personally looked at the labs and they are summarized above.  ----------------------------------------------------------------------------------------------------------------------  Detailed radiology reports for the last 24 hours:    Imaging Results (Last 24 Hours)     Procedure Component Value Units Date/Time    CT Chest Pulmonary Embolism [[id number]] Collected: 12/18/21 2205     Updated: 12/18/21 2208    Narrative:      CT CHEST WITH CONTRAST, PE PROTOCOL, 12/18/2021    HISTORY:  71-year-old male in the ED with shortness of air, cough and fever. Hypoxia. Positive COVID-19 testing. Assess for pulmonary embolism complicating COVID-19 pneumonia.    TECHNIQUE:  CT examination of the chest with IV contrast. CTA MIP multiplanar pulmonary artery images were reformatted with 3-D postprocessing. Radiation dose reduction techniques included automated exposure control. Radiation audit for CT and nuclear cardiology  exams in the last 12 months: 2.    COMPARISON:  *  CT chest, 1/22/2021.    FINDINGS:  No pulmonary embolism is demonstrated. Thoracic aorta is normal  in caliber with no aneurysm or dissection. Heart size is normal, there is no pericardial effusion.    Moderately extensive mild patchy groundglass opacities scattered throughout both lungs, typical for COVID-19 pneumonia in the current clinical setting. No dense airspace consolidation.    There is a tiny right pleural effusion with dependent right posterior lung base atelectasis. Trachea and central bronchi are patent. Mild to moderate diffuse centrilobular and paraseptal pulmonary emphysema.    Limited upper abdominal images show improved postinflammatory changes following prior acute pancreatitis seen on prior studies earlier this year. Cholecystectomy.      Impression:      1.  No evidence of pulmonary embolism or other acute vascular abnormality.  2.  Extensive mild diffuse groundglass pulmonary opacities in a pattern typical for COVID-19 pneumonia.  3.  Pulmonary emphysema.  4.  Tiny right pleural effusion.    Signer Name: Oscar Whaley MD   Signed: 12/18/2021 10:05 PM   Workstation Name: [email or web address]-    [facility]    XR Chest AP [[id number]] Collected: 12/18/21 2043     Updated: 12/18/21 2045    Narrative:      CHEST X-RAY, 12/18/2021      HISTORY:    71-year-old male in the ED with shortness of air, cough and fever. Hypoxia. COVID-19 testing is pending.    TECHNIQUE:  AP portable upright chest x-ray.      FINDINGS:  Faint patchy opacities scattered throughout the mid and lower lungs suspicious for mild multifocal pulmonary infiltrate. No dense airspace consolidation or pleural effusion.    Old bilateral rib fracture deformities with subpleural scarring in the lateral right midlung. Chronic elevation right hemidiaphragm. Heart size is normal.      Impression:      Faint patchy pulmonary opacities scattered within the mid and lower lungs concerning for mild multifocal pulmonary infiltrate.    Signer Name: Oscar Whaley MD   Signed: 12/18/2021 8:43 PM   Workstation Name:  [email or web address]-    [facility]          I have personally looked at the radiology images and I have read the available final reports.    Assessment & Plan       -Acute hypoxic respiratory failure due to bilateral COVID-19 pneumonia  -History of acute pancreatitis with a 5.5 cm mass that appears to be anterior to the head of the pancreas  -History of non-Hodgkin's lymphoma for which he received chemotherapy once and radiation twice  -History of type 2 diabetes mellitus  -History of essential hypertension  -Prolonged QTC of 455 ms    Admitted to the telemetry floor.  The patient is on day 10 of symptoms, and due to his age and possible immunocompromise status, I have decided to treat him with remdesivir.  We will also treat him with Decadron.  We will give him nasal cannula oxygen and wean it for saturations of 92% or above.  For his type 2 diabetes mellitus, we will continue his home Metformin as his kidney function is adequate at this time.  We will measure his glucose levels 4 times a day at bedside; if his glucose levels are above 200, then we will start him on a low-dose sliding scale of NovoLog.  Please note that the Decadron may increase his glucose levels.  I have continued his home atenolol and nifedipine.  We will continue to monitor his blood pressures and heart rates closely.  We will avoid QT prolonging agents and continue to monitor the electrolytes closely; we will replace the potassium and magnesium per our protocols if these levels are low during this hospitalization.  Goal potassium level is 4-4.5 and goal magnesium level is 2-2.2.  Please note the patient has been placed in enhanced COVID-19 isolation.    VTE Prophylaxis:   Mechanical Order History:     None      Pharmalogical Order History:      Ordered     Dose Route Frequency Stop    12/19/21 0157  enoxaparin (LOVENOX) syringe 40 mg         0.5 mg/kg SC Daily --    12/19/21 0156  Pharmacy to Dose enoxaparin (LOVENOX)         Question:  Indication of use  Answer:  Prophylaxis    -- XX Continuous PRN --              The patient is high risk due to the following diagnoses/reasons: Acute hypoxic respiratory failure due to bilateral COVID-19    Disposition: Anticipate home    Damaso Zapien MD  [facility]  12/19/21  06:41 EST

## 2021-12-19 NOTE — PLAN OF CARE
Goal Outcome Evaluation:  Plan of Care Reviewed With: patient        Progress: improving  Outcome Summary: Pt resting in bed this shift or up ad jeferson to the restroom. AOx4, VSS, no complaints or s/s of acute distress noted. Pt currently on 2L NC, SPO2 96%. Pt states no SOA on exertion, education provided regarding removing and applying nasal cannula when leaving the bed. IV access and telemetry monitoring patent and maintained, will continue to monitor.

## 2021-12-20 LAB
ALBUMIN SERPL-MCNC: 3.14 G/DL (ref 3.5–5.2)
ALBUMIN/GLOB SERPL: 1 G/DL
ALP SERPL-CCNC: 63 U/L (ref 39–117)
ALT SERPL W P-5'-P-CCNC: 18 U/L (ref 1–41)
ANION GAP SERPL CALCULATED.3IONS-SCNC: 10.6 MMOL/L (ref 5–15)
AST SERPL-CCNC: 19 U/L (ref 1–40)
BASOPHILS # BLD AUTO: 0.01 10*3/MM3 (ref 0–0.2)
BASOPHILS NFR BLD AUTO: 0.2 % (ref 0–1.5)
BILIRUB SERPL-MCNC: 0.5 MG/DL (ref 0–1.2)
BILIRUB UR QL STRIP: NEGATIVE
BUN SERPL-MCNC: 15 MG/DL (ref 8–23)
BUN/CREAT SERPL: 20 (ref 7–25)
CALCIUM SPEC-SCNC: 8.8 MG/DL (ref 8.6–10.5)
CHLORIDE SERPL-SCNC: 105 MMOL/L (ref 98–107)
CLARITY UR: CLEAR
CO2 SERPL-SCNC: 19.4 MMOL/L (ref 22–29)
COLOR UR: YELLOW
CREAT SERPL-MCNC: 0.75 MG/DL (ref 0.76–1.27)
DEPRECATED RDW RBC AUTO: 45.4 FL (ref 37–54)
EOSINOPHIL # BLD AUTO: 0 10*3/MM3 (ref 0–0.4)
EOSINOPHIL NFR BLD AUTO: 0 % (ref 0.3–6.2)
ERYTHROCYTE [DISTWIDTH] IN BLOOD BY AUTOMATED COUNT: 13.8 % (ref 12.3–15.4)
GFR SERPL CREATININE-BSD FRML MDRD: 102 ML/MIN/1.73
GLOBULIN UR ELPH-MCNC: 3.1 GM/DL
GLUCOSE BLDC GLUCOMTR-MCNC: 168 MG/DL (ref 70–130)
GLUCOSE BLDC GLUCOMTR-MCNC: 209 MG/DL (ref 70–130)
GLUCOSE BLDC GLUCOMTR-MCNC: 230 MG/DL (ref 70–130)
GLUCOSE BLDC GLUCOMTR-MCNC: 234 MG/DL (ref 70–130)
GLUCOSE SERPL-MCNC: 188 MG/DL (ref 65–99)
GLUCOSE UR STRIP-MCNC: ABNORMAL MG/DL
HCT VFR BLD AUTO: 40.5 % (ref 37.5–51)
HGB BLD-MCNC: 13.6 G/DL (ref 13–17.7)
HGB UR QL STRIP.AUTO: NEGATIVE
IMM GRANULOCYTES # BLD AUTO: 0.02 10*3/MM3 (ref 0–0.05)
IMM GRANULOCYTES NFR BLD AUTO: 0.4 % (ref 0–0.5)
KETONES UR QL STRIP: ABNORMAL
LEUKOCYTE ESTERASE UR QL STRIP.AUTO: NEGATIVE
LYMPHOCYTES # BLD AUTO: 0.72 10*3/MM3 (ref 0.7–3.1)
LYMPHOCYTES NFR BLD AUTO: 13.8 % (ref 19.6–45.3)
MAGNESIUM SERPL-MCNC: 1.9 MG/DL (ref 1.6–2.4)
MCH RBC QN AUTO: 30.1 PG (ref 26.6–33)
MCHC RBC AUTO-ENTMCNC: 33.6 G/DL (ref 31.5–35.7)
MCV RBC AUTO: 89.6 FL (ref 79–97)
MONOCYTES # BLD AUTO: 0.52 10*3/MM3 (ref 0.1–0.9)
MONOCYTES NFR BLD AUTO: 10 % (ref 5–12)
NEUTROPHILS NFR BLD AUTO: 3.93 10*3/MM3 (ref 1.7–7)
NEUTROPHILS NFR BLD AUTO: 75.6 % (ref 42.7–76)
NITRITE UR QL STRIP: NEGATIVE
NRBC BLD AUTO-RTO: 0 /100 WBC (ref 0–0.2)
PH UR STRIP.AUTO: 6.5 [PH] (ref 5–8)
PHOSPHATE SERPL-MCNC: 3.1 MG/DL (ref 2.5–4.5)
PLATELET # BLD AUTO: 188 10*3/MM3 (ref 140–450)
PMV BLD AUTO: 9.8 FL (ref 6–12)
POTASSIUM SERPL-SCNC: 4.5 MMOL/L (ref 3.5–5.2)
PROT SERPL-MCNC: 6.2 G/DL (ref 6–8.5)
PROT UR QL STRIP: ABNORMAL
RBC # BLD AUTO: 4.52 10*6/MM3 (ref 4.14–5.8)
SODIUM SERPL-SCNC: 135 MMOL/L (ref 136–145)
SP GR UR STRIP: 1.02 (ref 1–1.03)
UROBILINOGEN UR QL STRIP: ABNORMAL
WBC NRBC COR # BLD: 5.2 10*3/MM3 (ref 3.4–10.8)

## 2021-12-20 PROCEDURE — 82962 GLUCOSE BLOOD TEST: CPT

## 2021-12-20 PROCEDURE — 63710000001 DEXAMETHASONE PER 0.25 MG: Performed by: INTERNAL MEDICINE

## 2021-12-20 PROCEDURE — 85025 COMPLETE CBC W/AUTO DIFF WBC: CPT | Performed by: INTERNAL MEDICINE

## 2021-12-20 PROCEDURE — 25010000002 ENOXAPARIN PER 10 MG: Performed by: INTERNAL MEDICINE

## 2021-12-20 PROCEDURE — 81003 URINALYSIS AUTO W/O SCOPE: CPT | Performed by: INTERNAL MEDICINE

## 2021-12-20 PROCEDURE — 63710000001 INSULIN ASPART PER 5 UNITS: Performed by: INTERNAL MEDICINE

## 2021-12-20 PROCEDURE — 83735 ASSAY OF MAGNESIUM: CPT | Performed by: INTERNAL MEDICINE

## 2021-12-20 PROCEDURE — 99232 SBSQ HOSP IP/OBS MODERATE 35: CPT | Performed by: INTERNAL MEDICINE

## 2021-12-20 PROCEDURE — 80053 COMPREHEN METABOLIC PANEL: CPT | Performed by: INTERNAL MEDICINE

## 2021-12-20 PROCEDURE — 84100 ASSAY OF PHOSPHORUS: CPT | Performed by: INTERNAL MEDICINE

## 2021-12-20 RX ORDER — NICOTINE POLACRILEX 4 MG
15 LOZENGE BUCCAL
Status: DISCONTINUED | OUTPATIENT
Start: 2021-12-20 | End: 2021-12-22 | Stop reason: HOSPADM

## 2021-12-20 RX ORDER — DEXTROSE MONOHYDRATE 25 G/50ML
25 INJECTION, SOLUTION INTRAVENOUS
Status: DISCONTINUED | OUTPATIENT
Start: 2021-12-20 | End: 2021-12-22 | Stop reason: HOSPADM

## 2021-12-20 RX ADMIN — PANTOPRAZOLE SODIUM 40 MG: 40 TABLET, DELAYED RELEASE ORAL at 20:17

## 2021-12-20 RX ADMIN — ATENOLOL 25 MG: 25 TABLET ORAL at 09:13

## 2021-12-20 RX ADMIN — NIFEDIPINE 60 MG: 60 TABLET, EXTENDED RELEASE ORAL at 09:12

## 2021-12-20 RX ADMIN — ASPIRIN 81 MG: 81 TABLET, COATED ORAL at 09:12

## 2021-12-20 RX ADMIN — ALPRAZOLAM 1 MG: 1 TABLET ORAL at 17:50

## 2021-12-20 RX ADMIN — REMDESIVIR 100 MG: 100 INJECTION, POWDER, LYOPHILIZED, FOR SOLUTION INTRAVENOUS at 04:49

## 2021-12-20 RX ADMIN — SODIUM CHLORIDE, PRESERVATIVE FREE 3 ML: 5 INJECTION INTRAVENOUS at 09:11

## 2021-12-20 RX ADMIN — ENOXAPARIN SODIUM 40 MG: 40 INJECTION SUBCUTANEOUS at 09:13

## 2021-12-20 RX ADMIN — SODIUM CHLORIDE, PRESERVATIVE FREE 3 ML: 5 INJECTION INTRAVENOUS at 20:17

## 2021-12-20 RX ADMIN — INSULIN ASPART 3 UNITS: 100 INJECTION, SOLUTION INTRAVENOUS; SUBCUTANEOUS at 17:47

## 2021-12-20 RX ADMIN — PANTOPRAZOLE SODIUM 40 MG: 40 TABLET, DELAYED RELEASE ORAL at 09:12

## 2021-12-20 RX ADMIN — DEXAMETHASONE 6 MG: 4 TABLET ORAL at 09:12

## 2021-12-20 NOTE — PROGRESS NOTES
"Assisted By: Patient seen under Covid precautions    CC: Follow-up COVID-19 respiratory failure    Interview History/HPI: History reviewed, patient states he is feeling \"fine\".  He does not feel short of breath, his appetite and his taste coming back.  Denies any acute pain.    ROS:     Vitals:    12/20/21 1016   BP: 130/68   Pulse: 82   Resp: 18   Temp:    SpO2: 95%         Intake/Output Summary (Last 24 hours) at 12/20/2021 1320  Last data filed at 12/20/2021 0912  Gross per 24 hour   Intake 600 ml   Output 850 ml   Net -250 ml       EXAM: Temperature is 98, no distress, he is on 2 L with adequate saturations.  Lungs have bilateral breath sounds diminished bases but otherwise clear without abnormal sounds, not using sensory muscles to breathe, hearing is intact, mood good skin warm and dry heart regular rate and rhythm without murmur, abdomen soft, benign extremities are without edema      EKG: Sinus rhythm, somewhat low voltage image reviewed    Tele: Sinus rhythm    LABS:     Lab Results (last 48 hours)     Procedure Component Value Units Date/Time    POC Glucose Once [[id number]]  (Abnormal) Collected: 12/20/21 1017    Specimen: Blood Updated: 12/20/21 1028     Glucose 209 mg/dL      Comment: Meter: [id number] : [id number] ADINA RANDALL       POC Glucose Once [[id number]]  (Abnormal) Collected: 12/20/21 0641    Specimen: Blood Updated: 12/20/21 0656     Glucose 168 mg/dL      Comment: Meter: [id number] : [id number] ADINA RANDALL       Comprehensive Metabolic Panel [[id number]]  (Abnormal) Collected: 12/20/21 0237    Specimen: Blood Updated: 12/20/21 0340     Glucose 188 mg/dL      BUN 15 mg/dL      Creatinine 0.75 mg/dL      Sodium 135 mmol/L      Potassium 4.5 mmol/L      Chloride 105 mmol/L      CO2 19.4 mmol/L      Calcium 8.8 mg/dL      Total Protein 6.2 g/dL      Albumin 3.14 g/dL      ALT (SGPT) 18 U/L      AST (SGOT) 19 U/L      Alkaline Phosphatase 63 U/L      Total Bilirubin 0.5 mg/dL      " eGFR Non African Amer 102 mL/min/1.73      Globulin 3.1 gm/dL      A/G Ratio 1.0 g/dL      BUN/Creatinine Ratio 20.0     Anion Gap 10.6 mmol/L     Narrative:      GFR Normal >60  Chronic Kidney Disease <60  Kidney Failure <15      Phosphorus [[id number]]  (Normal) Collected: 12/20/21 0237    Specimen: Blood Updated: 12/20/21 0340     Phosphorus 3.1 mg/dL     Magnesium [[id number]]  (Normal) Collected: 12/20/21 0237    Specimen: Blood Updated: 12/20/21 0340     Magnesium 1.9 mg/dL     CBC & Differential [[id number]]  (Abnormal) Collected: 12/20/21 0237    Specimen: Blood Updated: 12/20/21 0315    Narrative:      The following orders were created for panel order CBC & Differential.  Procedure                               Abnormality         Status                     ---------                               -----------         ------                     CBC Auto Differential[[id number]]        Abnormal            Final result                 Please view results for these tests on the individual orders.    CBC Auto Differential [[id number]]  (Abnormal) Collected: 12/20/21 0237    Specimen: Blood Updated: 12/20/21 0315     WBC 5.20 10*3/mm3      RBC 4.52 10*6/mm3      Hemoglobin 13.6 g/dL      Hematocrit 40.5 %      MCV 89.6 fL      MCH 30.1 pg      MCHC 33.6 g/dL      RDW 13.8 %      RDW-SD 45.4 fl      MPV 9.8 fL      Platelets 188 10*3/mm3      Neutrophil % 75.6 %      Lymphocyte % 13.8 %      Monocyte % 10.0 %      Eosinophil % 0.0 %      Basophil % 0.2 %      Immature Grans % 0.4 %      Neutrophils, Absolute 3.93 10*3/mm3      Lymphocytes, Absolute 0.72 10*3/mm3      Monocytes, Absolute 0.52 10*3/mm3      Eosinophils, Absolute 0.00 10*3/mm3      Basophils, Absolute 0.01 10*3/mm3      Immature Grans, Absolute 0.02 10*3/mm3      nRBC 0.0 /100 WBC     Blood Culture - Blood, Arm, Left [[id number]]  (Normal) Collected: 12/18/21 2004    Specimen: Blood from Arm, Left Updated: 12/19/21 2031     Blood Culture No growth at 24  "hours    Blood Culture - Blood, Arm, Left [[id number]]  (Normal) Collected: 12/18/21 2011    Specimen: Blood from Arm, Left Updated: 12/19/21 2031     Blood Culture No growth at 24 hours    POC Glucose Once [[id number]]  (Abnormal) Collected: 12/19/21 1908    Specimen: Blood Updated: 12/19/21 1914     Glucose 230 mg/dL      Comment: Meter: [id number] : [id number] SYDNEY VENU       POC Glucose Once [[id number]]  (Abnormal) Collected: 12/19/21 1618    Specimen: Blood Updated: 12/19/21 1626     Glucose 222 mg/dL      Comment: Meter: [id number] : [id number] ADINA RANDALL       Procalcitonin [[id number]]  (Normal) Collected: 12/18/21 2004    Specimen: Blood Updated: 12/19/21 1313     Procalcitonin 0.09 ng/mL     Narrative:      As a Marker for Sepsis (Non-Neonates):     1. <0.5 ng/mL represents a low risk of severe sepsis and/or septic shock.  2. >2 ng/mL represents a high risk of severe sepsis and/or septic shock.    As a Marker for Lower Respiratory Tract Infections that require antibiotic therapy:  PCT on Admission     Antibiotic Therapy             6-12 Hrs later  >0.5                          Strongly Recommended            >0.25 - <0.5             Recommended  0.1 - 0.25                  Discouraged                       Remeasure/reassess PCT  <0.1                         Strongly Discouraged         Remeasure/reassess PCT      As 28 day mortality risk marker: \"Change in Procalcitonin Result\" (>80% or <=80%) if Day 0 (or Day 1) and Day 4 values are available. Refer to http://[email or web address]-pct-[email or web address]/    Change in PCT <=80 %   A decrease of PCT levels below or equal to 80% defines a positive change in PCT test result representing a higher risk for 28-day all-cause mortality of patients diagnosed with severe sepsis or septic shock.    Change in PCT >80 %   A decrease of PCT levels of more than 80% defines a negative change in PCT result representing a lower risk for 28-day all-cause mortality of " patients diagnosed with severe sepsis or septic shock.                POC Glucose Once [[id number]]  (Abnormal) Collected: 12/19/21 1024    Specimen: Blood Updated: 12/19/21 1033     Glucose 216 mg/dL      Comment: Meter: [id number] : [id number] ADINA RANDALL       Creatinine, Serum [[id number]]  (Normal) Collected: 12/19/21 0548    Specimen: Blood Updated: 12/19/21 0630     Creatinine 0.88 mg/dL      eGFR Non African Amer 85 mL/min/1.73     Narrative:      GFR Normal >60  Chronic Kidney Disease <60  Kidney Failure <15      Hepatic Function Panel [[id number]]  (Abnormal) Collected: 12/19/21 0548    Specimen: Blood Updated: 12/19/21 0630     Total Protein 6.2 g/dL      Albumin 3.20 g/dL      ALT (SGPT) 18 U/L      AST (SGOT) 22 U/L      Alkaline Phosphatase 72 U/L      Total Bilirubin 0.4 mg/dL      Bilirubin, Direct 0.2 mg/dL      Bilirubin, Indirect 0.2 mg/dL     Hemoglobin A1c [[id number]]  (Abnormal) Collected: 12/18/21 2004    Specimen: Blood Updated: 12/19/21 0311     Hemoglobin A1C 6.70 %     Narrative:      Hemoglobin A1C Ranges:    Increased Risk for Diabetes  5.7% to 6.4%  Diabetes                     >= 6.5%  Diabetic Goal                < 7.0%    TSH [[id number]]  (Normal) Collected: 12/18/21 2004    Specimen: Blood Updated: 12/19/21 0310     TSH 1.970 uIU/mL     Blood Gas, Arterial With Co-Ox [[id number]]  (Abnormal) Collected: 12/18/21 2113    Specimen: Arterial Blood Updated: 12/18/21 2124     Site Left Radial     Albino's Test Positive     pH, Arterial 7.508 pH units      Comment: 83 Value above reference range        pCO2, Arterial 30.0 mm Hg      Comment: 84 Value below reference range        pO2, Arterial 71.4 mm Hg      Comment: 84 Value below reference range        HCO3, Arterial 23.8 mmol/L      Base Excess, Arterial 1.6 mmol/L      O2 Saturation, Arterial 92.8 %      Comment: 84 Value below reference range        Hemoglobin, Blood Gas 14.2 g/dL      Hematocrit, Blood Gas 43.4 %       Oxyhemoglobin 93.1 %      Comment: 84 Value below reference range        Methemoglobin 0.10 %      Carboxyhemoglobin <0.2 %      Comment: 94 Value below reportable range < 0.2        A-a Gradiant 85.6 mmHg      CO2 Content 24.7 mmol/L      Temperature 0.0 C      Barometric Pressure for Blood Gas 727 mmHg      Modality Nasal Cannula     FIO2 28 %      Flow Rate 2.0 lpm      Ventilator Mode NA     Note --     Collected by [id number]     Comment: Meter: [id number]     :  [id number]        pH, Temp Corrected --     pCO2, Temperature Corrected --     pO2, Temperature Corrected --    Respiratory Panel PCR w/COVID-19(SARS-CoV-2) CARLOS/LEI/VESNA/PAD/COR/MAD/JB In-House, NP Swab in UTM/VTM, 3-4 HR TAT - Swab, Nasopharynx [[id number]]  (Abnormal) Collected: 12/18/21 2013    Specimen: Swab from Nasopharynx Updated: 12/18/21 2117     ADENOVIRUS, PCR Not Detected     Coronavirus 229E Not Detected     Coronavirus HKU1 Not Detected     Coronavirus NL63 Not Detected     Coronavirus OC43 Not Detected     COVID19 Detected     Human Metapneumovirus Not Detected     Human Rhinovirus/Enterovirus Not Detected     Influenza A PCR Not Detected     Influenza B PCR Not Detected     Parainfluenza Virus 1 Not Detected     Parainfluenza Virus 2 Not Detected     Parainfluenza Virus 3 Not Detected     Parainfluenza Virus 4 Not Detected     RSV, PCR Not Detected     Bordetella pertussis pcr Not Detected     Bordetella parapertussis PCR Not Detected     Chlamydophila pneumoniae PCR Not Detected     Mycoplasma pneumo by PCR Not Detected    Narrative:      In the setting of a positive respiratory panel with a viral infection PLUS a negative procalcitonin without other underlying concern for bacterial infection, consider observing off antibiotics or discontinuation of antibiotics and continue supportive care. If the respiratory panel is positive for atypical bacterial infection (Bordetella pertussis, Chlamydophila pneumoniae, or Mycoplasma  pneumoniae), consider antibiotic de-escalation to target atypical bacterial infection.    [place] Draw [[id number]] Collected: 12/18/21 2004    Specimen: Blood Updated: 12/18/21 2115    Narrative:      The following orders were created for panel order [place] Draw.  Procedure                               Abnormality         Status                     ---------                               -----------         ------                     Green Top (Gel)[[id number]]                                  Final result               Lavender Top[[id number]]                                     Final result               Gold Top - SST[[id number]]                                   Final result               Light Blue Top[[id number]]                                   Final result                 Please view results for these tests on the individual orders.    Green Top (Gel) [[id number]] Collected: 12/18/21 2004    Specimen: Blood Updated: 12/18/21 2115     Extra Tube Hold for add-ons.     Comment: Auto resulted.       Gold Top - SST [[id number]] Collected: 12/18/21 2004    Specimen: Blood Updated: 12/18/21 2115     Extra Tube Hold for add-ons.     Comment: Auto resulted.       Light Blue Top [[id number]] Collected: 12/18/21 2004    Specimen: Blood Updated: 12/18/21 2115     Extra Tube hold for add-on     Comment: Auto resulted       Lavender Top [[id number]] Collected: 12/18/21 2004    Specimen: Blood Updated: 12/18/21 2115     Extra Tube hold for add-on     Comment: Auto resulted       Ferritin [[id number]]  (Abnormal) Collected: 12/18/21 2004    Specimen: Blood Updated: 12/18/21 2111     Ferritin 2,389.00 ng/mL     Narrative:      Results may be falsely decreased if patient taking Biotin.      BNP [[id number]]  (Normal) Collected: 12/18/21 2004    Specimen: Blood Updated: 12/18/21 2049     proBNP 587.0 pg/mL     Narrative:      Among patients with dyspnea, NT-proBNP is highly sensitive for the detection of acute congestive heart  failure. In addition NT-proBNP of <300 pg/ml effectively rules out acute congestive heart failure with 99% negative predictive value.    Results may be falsely decreased if patient taking Biotin.      Troponin [[id number]]  (Normal) Collected: 12/18/21 2004    Specimen: Blood Updated: 12/18/21 2049     Troponin T <0.010 ng/mL     Narrative:      Troponin T Reference Range:  <= 0.03 ng/mL-   Negative for AMI  >0.03 ng/mL-     Abnormal for myocardial necrosis.  Clinicians would have to utilize clinical acumen, EKG, Troponin and serial changes to determine if it is an Acute Myocardial Infarction or myocardial injury due to an underlying chronic condition.       Results may be falsely decreased if patient taking Biotin.      Comprehensive Metabolic Panel [[id number]]  (Abnormal) Collected: 12/18/21 2004    Specimen: Blood Updated: 12/18/21 2046     Glucose 134 mg/dL      BUN 11 mg/dL      Creatinine 0.85 mg/dL      Sodium 135 mmol/L      Potassium 4.1 mmol/L      Chloride 100 mmol/L      CO2 24.4 mmol/L      Calcium 9.2 mg/dL      Total Protein 6.7 g/dL      Albumin 3.60 g/dL      ALT (SGPT) 24 U/L      AST (SGOT) 28 U/L      Alkaline Phosphatase 77 U/L      Total Bilirubin 0.5 mg/dL      eGFR Non African Amer 89 mL/min/1.73      Globulin 3.1 gm/dL      A/G Ratio 1.2 g/dL      BUN/Creatinine Ratio 12.9     Anion Gap 10.6 mmol/L     Narrative:      GFR Normal >60  Chronic Kidney Disease <60  Kidney Failure <15      Lactate Dehydrogenase [[id number]]  (Abnormal) Collected: 12/18/21 2004    Specimen: Blood Updated: 12/18/21 2046      U/L     C-reactive Protein [[id number]]  (Abnormal) Collected: 12/18/21 2004    Specimen: Blood Updated: 12/18/21 2046     C-Reactive Protein 5.48 mg/dL     D-dimer, Quantitative [[id number]]  (Abnormal) Collected: 12/18/21 2004    Specimen: Blood Updated: 12/18/21 2046     D-Dimer, Quantitative 1.72 MCGFEU/mL     Narrative:      d-Dimer assay result is to be used in conjunction with a  non-high clinical pretest probability (PTP) assessment tool to exclude PE and aid in diagnosis of VTE with cutoff of 0.5 MCGFEU/mL.    Lactic Acid, Plasma [[id number]]  (Normal) Collected: 12/18/21 2004    Specimen: Blood Updated: 12/18/21 2042     Lactate 1.2 mmol/L     CBC & Differential [[id number]]  (Abnormal) Collected: 12/18/21 2004    Specimen: Blood Updated: 12/18/21 2022    Narrative:      The following orders were created for panel order CBC & Differential.  Procedure                               Abnormality         Status                     ---------                               -----------         ------                     CBC Auto Differential[[id number]]        Abnormal            Final result                 Please view results for these tests on the individual orders.    CBC Auto Differential [[id number]]  (Abnormal) Collected: 12/18/21 2004    Specimen: Blood Updated: 12/18/21 2022     WBC 4.15 10*3/mm3      RBC 4.85 10*6/mm3      Hemoglobin 14.3 g/dL      Hematocrit 44.1 %      MCV 90.9 fL      MCH 29.5 pg      MCHC 32.4 g/dL      RDW 14.0 %      RDW-SD 46.9 fl      MPV 9.6 fL      Platelets 143 10*3/mm3      Neutrophil % 68.4 %      Lymphocyte % 21.4 %      Monocyte % 9.6 %      Eosinophil % 0.2 %      Basophil % 0.2 %      Immature Grans % 0.2 %      Neutrophils, Absolute 2.83 10*3/mm3      Lymphocytes, Absolute 0.89 10*3/mm3      Monocytes, Absolute 0.40 10*3/mm3      Eosinophils, Absolute 0.01 10*3/mm3      Basophils, Absolute 0.01 10*3/mm3      Immature Grans, Absolute 0.01 10*3/mm3      nRBC 0.0 /100 WBC     Blood Gas, Arterial With Co-Ox [[id number]]  (Abnormal) Collected: 12/18/21 1917    Specimen: Arterial Blood Updated: 12/18/21 1917     Site Left Radial     Albino's Test N/A     pH, Arterial 7.499 pH units      Comment: 83 Value above reference range        pCO2, Arterial 30.4 mm Hg      pO2, Arterial 49.8 mm Hg      Comment: 85 Value below critical limit        HCO3, Arterial 23.6 mmol/L       Base Excess, Arterial 1.3 mmol/L      O2 Saturation, Arterial 86.0 %      Comment: 84 Value below reference range        Hemoglobin, Blood Gas 14.6 g/dL      Hematocrit, Blood Gas 44.6 %      Oxyhemoglobin 86.3 %      Comment: 84 Value below reference range        Methemoglobin 0.00 %      Carboxyhemoglobin <0.2 %      Comment: 94 Value below reportable range < 0.2        A-a Gradiant 58.6 mmHg      CO2 Content 24.6 mmol/L      Temperature 0.0 C      Barometric Pressure for Blood Gas 727 mmHg      Modality Room Air     FIO2 21 %      Ventilator Mode NA     Note Read back and acknowledge     Notified Who DR GUERRERO RN     Notified By [id number]     Notified Time 12/18/2021 19:18     Collected by [id number]     Comment: Meter: [id number]     :  [id number]        pH, Temp Corrected --     pCO2, Temperature Corrected --     pO2, Temperature Corrected --               Radiology:    Imaging Results (Last 72 Hours)     Procedure Component Value Units Date/Time    CT Chest Pulmonary Embolism [[id number]] Collected: 12/18/21 2205     Updated: 12/18/21 2208    Narrative:      CT CHEST WITH CONTRAST, PE PROTOCOL, 12/18/2021    HISTORY:  71-year-old male in the ED with shortness of air, cough and fever. Hypoxia. Positive COVID-19 testing. Assess for pulmonary embolism complicating COVID-19 pneumonia.    TECHNIQUE:  CT examination of the chest with IV contrast. CTA MIP multiplanar pulmonary artery images were reformatted with 3-D postprocessing. Radiation dose reduction techniques included automated exposure control. Radiation audit for CT and nuclear cardiology  exams in the last 12 months: 2.    COMPARISON:  *  CT chest, 1/22/2021.    FINDINGS:  No pulmonary embolism is demonstrated. Thoracic aorta is normal in caliber with no aneurysm or dissection. Heart size is normal, there is no pericardial effusion.    Moderately extensive mild patchy groundglass opacities scattered throughout both lungs, typical for COVID-19 pneumonia  in the current clinical setting. No dense airspace consolidation.    There is a tiny right pleural effusion with dependent right posterior lung base atelectasis. Trachea and central bronchi are patent. Mild to moderate diffuse centrilobular and paraseptal pulmonary emphysema.    Limited upper abdominal images show improved postinflammatory changes following prior acute pancreatitis seen on prior studies earlier this year. Cholecystectomy.      Impression:      1.  No evidence of pulmonary embolism or other acute vascular abnormality.  2.  Extensive mild diffuse groundglass pulmonary opacities in a pattern typical for COVID-19 pneumonia.  3.  Pulmonary emphysema.  4.  Tiny right pleural effusion.    Signer Name: Oscar Whaley MD   Signed: 12/18/2021 10:05 PM   Workstation Name: [facility]    Radiology [facility]    XR Chest AP [[id number]] Collected: 12/18/21 2043     Updated: 12/18/21 2045    Narrative:      CHEST X-RAY, 12/18/2021      HISTORY:    71-year-old male in the ED with shortness of air, cough and fever. Hypoxia. COVID-19 testing is pending.    TECHNIQUE:  AP portable upright chest x-ray.      FINDINGS:  Faint patchy opacities scattered throughout the mid and lower lungs suspicious for mild multifocal pulmonary infiltrate. No dense airspace consolidation or pleural effusion.    Old bilateral rib fracture deformities with subpleural scarring in the lateral right midlung. Chronic elevation right hemidiaphragm. Heart size is normal.      Impression:      Faint patchy pulmonary opacities scattered within the mid and lower lungs concerning for mild multifocal pulmonary infiltrate.    Signer Name: Oscar Whaley MD   Signed: 12/18/2021 8:43 PM   Workstation Name: [facility]    Radiology [facility]            Assessment/Plan:   Acute hypoxic respiratory failure due to COVID-19 pneumonia.  Patient is non-smoker and has been vaccinated with 2 doses, he was going to get  his booster but then got Covid.  He has been stable on 2 L of oxygen, continuing remdesivir and Decadron, explained what these medicines were for and he voiced understanding and is in agreement.  I did explain that we potentially if he has increasing oxygenation requirements could use Tocilizumab but would discuss this further at that time if required.  Calcitonin was normal, will recheck this and CRP in the a.m.  D-dimer was 1.72 with a negative pulmonary embolus protocol for PE.  Imaging of the CT chest to my viewing was consistent with Covid.    DVT prophylaxis, continue tier 1 prophylaxis with Lovenox.    Diabetes, well controlled by hemoglobin A1c but since the patient did have contrast less than 48 hours ago, stop Metformin and added sliding scale insulin.    Pretension, controlled, follow    Disposition Home    Cem Padilla MD

## 2021-12-20 NOTE — PLAN OF CARE
Goal Outcome Evaluation:      Patient has been pleasant tonight. No complaints of chest pain or shortness of breath. Vital signs stable. Will continue to monitor and follow plan of care.

## 2021-12-20 NOTE — CASE MANAGEMENT/SOCIAL WORK
Discharge Planning Assessment   Ji     Patient Name: Drake Brandon  MRN: [id number]  Today's Date: 12/20/2021    Admit Date: 12/18/2021     Discharge Needs Assessment     Row Name 12/20/21 1546       Living Environment    Lives With spouse    Name(s) of Who Lives With Patient Lives at home with spouse, Carla.  Son resides next door.    Current Living Arrangements home/apartment/condo    Primary Care Provided by self    Provides Primary Care For no one    Family Caregiver if Needed spouse; child(shavon), adult    Quality of Family Relationships helpful; involved; supportive    Able to Return to Prior Arrangements yes       Transition Planning    Patient/Family Anticipates Transition to home with family    Transportation Anticipated family or friend will provide       Discharge Needs Assessment    Equipment Currently Used at Home none               Discharge Plan     Row Name 12/20/21 1547       Plan    Plan Pt admitted on 12/18/21.  SS received consult per [facility] for discharge planning.  SS spoke with pt on this date.  Pt lives at home with spouse, Carla, and plans to return home at discharge.  [facility]  Pt currently does not utilize DME.  SS will follow and assist with discharge needs.              Continued Care and Services - Admitted Since 12/18/2021    Coordination has not been started for this encounter.       Expected Discharge Date and Time     Expected Discharge Date Expected Discharge Time    Dec 24, 2021          Demographic Summary     Row Name 12/20/21 1546       General Information    Admission Type inpatient    Referral Source nursing    Reason for Consult discharge planning    Preferred Language English                      Katia Cruz, BSW

## 2021-12-20 NOTE — PLAN OF CARE
Goal Outcome Evaluation:           Progress: improving   Pt resting in bed with no acute distress noted. Pt has voiced no complaints today other than  anxiety for which medication was given, see MAR. Urinalysis collected and sent to lab, waiting for results. VSS. Will continue to monitor and follow plan of care

## 2021-12-21 LAB
ALBUMIN SERPL-MCNC: 3.36 G/DL (ref 3.5–5.2)
ALBUMIN/GLOB SERPL: 1.1 G/DL
ALP SERPL-CCNC: 71 U/L (ref 39–117)
ALT SERPL W P-5'-P-CCNC: 20 U/L (ref 1–41)
ANION GAP SERPL CALCULATED.3IONS-SCNC: 10.3 MMOL/L (ref 5–15)
AST SERPL-CCNC: 18 U/L (ref 1–40)
BASOPHILS # BLD AUTO: 0 10*3/MM3 (ref 0–0.2)
BASOPHILS NFR BLD AUTO: 0 % (ref 0–1.5)
BILIRUB SERPL-MCNC: 0.5 MG/DL (ref 0–1.2)
BUN SERPL-MCNC: 18 MG/DL (ref 8–23)
BUN/CREAT SERPL: 21.7 (ref 7–25)
CALCIUM SPEC-SCNC: 8.9 MG/DL (ref 8.6–10.5)
CHLORIDE SERPL-SCNC: 103 MMOL/L (ref 98–107)
CK SERPL-CCNC: 57 U/L (ref 20–200)
CO2 SERPL-SCNC: 20.7 MMOL/L (ref 22–29)
CREAT SERPL-MCNC: 0.83 MG/DL (ref 0.76–1.27)
CRP SERPL-MCNC: 0.89 MG/DL (ref 0–0.5)
D DIMER PPP FEU-MCNC: 1.48 MCGFEU/ML (ref 0–0.5)
DEPRECATED RDW RBC AUTO: 45.5 FL (ref 37–54)
EOSINOPHIL # BLD AUTO: 0 10*3/MM3 (ref 0–0.4)
EOSINOPHIL NFR BLD AUTO: 0 % (ref 0.3–6.2)
ERYTHROCYTE [DISTWIDTH] IN BLOOD BY AUTOMATED COUNT: 13.6 % (ref 12.3–15.4)
GFR SERPL CREATININE-BSD FRML MDRD: 91 ML/MIN/1.73
GLOBULIN UR ELPH-MCNC: 2.9 GM/DL
GLUCOSE BLDC GLUCOMTR-MCNC: 156 MG/DL (ref 70–130)
GLUCOSE BLDC GLUCOMTR-MCNC: 191 MG/DL (ref 70–130)
GLUCOSE BLDC GLUCOMTR-MCNC: 261 MG/DL (ref 70–130)
GLUCOSE BLDC GLUCOMTR-MCNC: 278 MG/DL (ref 70–130)
GLUCOSE SERPL-MCNC: 194 MG/DL (ref 65–99)
HCT VFR BLD AUTO: 41.2 % (ref 37.5–51)
HGB BLD-MCNC: 13.5 G/DL (ref 13–17.7)
IMM GRANULOCYTES # BLD AUTO: 0.03 10*3/MM3 (ref 0–0.05)
IMM GRANULOCYTES NFR BLD AUTO: 0.4 % (ref 0–0.5)
LYMPHOCYTES # BLD AUTO: 0.74 10*3/MM3 (ref 0.7–3.1)
LYMPHOCYTES NFR BLD AUTO: 10.6 % (ref 19.6–45.3)
MAGNESIUM SERPL-MCNC: 1.8 MG/DL (ref 1.6–2.4)
MCH RBC QN AUTO: 29.6 PG (ref 26.6–33)
MCHC RBC AUTO-ENTMCNC: 32.8 G/DL (ref 31.5–35.7)
MCV RBC AUTO: 90.4 FL (ref 79–97)
MONOCYTES # BLD AUTO: 0.55 10*3/MM3 (ref 0.1–0.9)
MONOCYTES NFR BLD AUTO: 7.9 % (ref 5–12)
NEUTROPHILS NFR BLD AUTO: 5.64 10*3/MM3 (ref 1.7–7)
NEUTROPHILS NFR BLD AUTO: 81.1 % (ref 42.7–76)
NRBC BLD AUTO-RTO: 0 /100 WBC (ref 0–0.2)
PHOSPHATE SERPL-MCNC: 2.9 MG/DL (ref 2.5–4.5)
PLATELET # BLD AUTO: 220 10*3/MM3 (ref 140–450)
PMV BLD AUTO: 9.8 FL (ref 6–12)
POTASSIUM SERPL-SCNC: 4.5 MMOL/L (ref 3.5–5.2)
PROCALCITONIN SERPL-MCNC: 0.06 NG/ML (ref 0–0.25)
PROT SERPL-MCNC: 6.3 G/DL (ref 6–8.5)
RBC # BLD AUTO: 4.56 10*6/MM3 (ref 4.14–5.8)
SODIUM SERPL-SCNC: 134 MMOL/L (ref 136–145)
WBC NRBC COR # BLD: 6.96 10*3/MM3 (ref 3.4–10.8)

## 2021-12-21 PROCEDURE — 85025 COMPLETE CBC W/AUTO DIFF WBC: CPT | Performed by: INTERNAL MEDICINE

## 2021-12-21 PROCEDURE — 82962 GLUCOSE BLOOD TEST: CPT

## 2021-12-21 PROCEDURE — 63710000001 INSULIN ASPART PER 5 UNITS: Performed by: INTERNAL MEDICINE

## 2021-12-21 PROCEDURE — 80053 COMPREHEN METABOLIC PANEL: CPT | Performed by: INTERNAL MEDICINE

## 2021-12-21 PROCEDURE — 84100 ASSAY OF PHOSPHORUS: CPT | Performed by: INTERNAL MEDICINE

## 2021-12-21 PROCEDURE — 86140 C-REACTIVE PROTEIN: CPT | Performed by: INTERNAL MEDICINE

## 2021-12-21 PROCEDURE — 99232 SBSQ HOSP IP/OBS MODERATE 35: CPT | Performed by: INTERNAL MEDICINE

## 2021-12-21 PROCEDURE — 25010000002 ENOXAPARIN PER 10 MG: Performed by: INTERNAL MEDICINE

## 2021-12-21 PROCEDURE — 84145 PROCALCITONIN (PCT): CPT | Performed by: INTERNAL MEDICINE

## 2021-12-21 PROCEDURE — 85379 FIBRIN DEGRADATION QUANT: CPT | Performed by: INTERNAL MEDICINE

## 2021-12-21 PROCEDURE — 83735 ASSAY OF MAGNESIUM: CPT | Performed by: INTERNAL MEDICINE

## 2021-12-21 PROCEDURE — 82550 ASSAY OF CK (CPK): CPT | Performed by: INTERNAL MEDICINE

## 2021-12-21 PROCEDURE — 63710000001 DEXAMETHASONE PER 0.25 MG: Performed by: INTERNAL MEDICINE

## 2021-12-21 RX ADMIN — ATENOLOL 25 MG: 25 TABLET ORAL at 09:29

## 2021-12-21 RX ADMIN — ASPIRIN 81 MG: 81 TABLET, COATED ORAL at 09:29

## 2021-12-21 RX ADMIN — NIFEDIPINE 60 MG: 60 TABLET, EXTENDED RELEASE ORAL at 09:29

## 2021-12-21 RX ADMIN — INSULIN ASPART 4 UNITS: 100 INJECTION, SOLUTION INTRAVENOUS; SUBCUTANEOUS at 11:46

## 2021-12-21 RX ADMIN — ALPRAZOLAM 1 MG: 1 TABLET ORAL at 23:31

## 2021-12-21 RX ADMIN — METFORMIN HYDROCHLORIDE 500 MG: 500 TABLET ORAL at 17:34

## 2021-12-21 RX ADMIN — PANTOPRAZOLE SODIUM 40 MG: 40 TABLET, DELAYED RELEASE ORAL at 20:46

## 2021-12-21 RX ADMIN — INSULIN ASPART 2 UNITS: 100 INJECTION, SOLUTION INTRAVENOUS; SUBCUTANEOUS at 09:31

## 2021-12-21 RX ADMIN — INSULIN ASPART 2 UNITS: 100 INJECTION, SOLUTION INTRAVENOUS; SUBCUTANEOUS at 17:34

## 2021-12-21 RX ADMIN — DEXAMETHASONE 6 MG: 4 TABLET ORAL at 09:29

## 2021-12-21 RX ADMIN — ENOXAPARIN SODIUM 40 MG: 40 INJECTION SUBCUTANEOUS at 09:30

## 2021-12-21 RX ADMIN — SODIUM CHLORIDE, PRESERVATIVE FREE 3 ML: 5 INJECTION INTRAVENOUS at 20:46

## 2021-12-21 RX ADMIN — PANTOPRAZOLE SODIUM 40 MG: 40 TABLET, DELAYED RELEASE ORAL at 09:29

## 2021-12-21 RX ADMIN — SODIUM CHLORIDE, PRESERVATIVE FREE 3 ML: 5 INJECTION INTRAVENOUS at 09:30

## 2021-12-21 RX ADMIN — REMDESIVIR 100 MG: 100 INJECTION, POWDER, LYOPHILIZED, FOR SOLUTION INTRAVENOUS at 04:54

## 2021-12-21 RX ADMIN — ALPRAZOLAM 1 MG: 1 TABLET ORAL at 13:25

## 2021-12-21 NOTE — PLAN OF CARE
Goal Outcome Evaluation:  Plan of Care Reviewed With: patient        Progress: improving  Outcome Summary: Pt currently sitting up in bed watching television. Denies any complaints at this time. V/S stable with no signs of respiratory distress present. Pt has shown much improvement; maintained O2 saturation >92% on room air. Removed O2 around 9:30 a.m. Per Dr. Padilla, if pt continues to improve he will possibly be D/C'd tomorrow.

## 2021-12-21 NOTE — PROGRESS NOTES
"Assisted By: Lore KING    CC: Follow-up on Covid respiratory failure    Interview History/HPI: Patient states he feels \"fine\".  Minimal cough, taste is intact, no shortness of breath.    ROS:     Vitals:    12/21/21 1019   BP: 155/73   Pulse: 75   Resp: 18   Temp: 97.8 °F (36.6 °C)   SpO2: 95%         Intake/Output Summary (Last 24 hours) at 12/21/2021 1239  Last data filed at 12/21/2021 0933  Gross per 24 hour   Intake 1470 ml   Output --   Net 1470 ml       EXAM: Lungs have bilateral breath sounds diminished but clear heart regular rate and rhythm without murmur gallop skin warm and dry mood good no edema abdomen soft, benign      EKG:     Tele: Sinus rhythm    LABS:     Lab Results (last 48 hours)     Procedure Component Value Units Date/Time    Procalcitonin [[id number]]  (Normal) Collected: 12/21/21 0016    Specimen: Blood Updated: 12/21/21 1233     Procalcitonin 0.06 ng/mL     Narrative:      As a Marker for Sepsis (Non-Neonates):     1. <0.5 ng/mL represents a low risk of severe sepsis and/or septic shock.  2. >2 ng/mL represents a high risk of severe sepsis and/or septic shock.    As a Marker for Lower Respiratory Tract Infections that require antibiotic therapy:  PCT on Admission     Antibiotic Therapy             6-12 Hrs later  >0.5                          Strongly Recommended            >0.25 - <0.5             Recommended  0.1 - 0.25                  Discouraged                       Remeasure/reassess PCT  <0.1                         Strongly Discouraged         Remeasure/reassess PCT      As 28 day mortality risk marker: \"Change in Procalcitonin Result\" (>80% or <=80%) if Day 0 (or Day 1) and Day 4 values are available. Refer to http://www.[employer]-pct-calculator.com/    Change in PCT <=80 %   A decrease of PCT levels below or equal to 80% defines a positive change in PCT test result representing a higher risk for 28-day all-cause mortality of patients diagnosed with severe sepsis or septic " shock.    Change in PCT >80 %   A decrease of PCT levels of more than 80% defines a negative change in PCT result representing a lower risk for 28-day all-cause mortality of patients diagnosed with severe sepsis or septic shock.                POC Glucose Once [[id number]]  (Abnormal) Collected: 12/21/21 1021    Specimen: Blood Updated: 12/21/21 1037     Glucose 261 mg/dL      Comment: Meter: [id number] : [id number] ADINA RANDLAL       POC Glucose Once [[id number]]  (Abnormal) Collected: 12/21/21 0653    Specimen: Blood Updated: 12/21/21 0704     Glucose 156 mg/dL      Comment: Meter: [id number] : [id number] ADINA RANDALL       D-dimer, Quantitative [[id number]]  (Abnormal) Collected: 12/21/21 0016    Specimen: Blood Updated: 12/21/21 0117     D-Dimer, Quantitative 1.48 MCGFEU/mL     Narrative:      d-Dimer assay result is to be used in conjunction with a non-high clinical pretest probability (PTP) assessment tool to exclude PE and aid in diagnosis of VTE with cutoff of 0.5 MCGFEU/mL.    Comprehensive Metabolic Panel [[id number]]  (Abnormal) Collected: 12/21/21 0016    Specimen: Blood Updated: 12/21/21 0115     Glucose 194 mg/dL      BUN 18 mg/dL      Creatinine 0.83 mg/dL      Sodium 134 mmol/L      Potassium 4.5 mmol/L      Comment: Slight hemolysis detected by analyzer. Results may be affected.        Chloride 103 mmol/L      CO2 20.7 mmol/L      Calcium 8.9 mg/dL      Total Protein 6.3 g/dL      Albumin 3.36 g/dL      ALT (SGPT) 20 U/L      AST (SGOT) 18 U/L      Alkaline Phosphatase 71 U/L      Total Bilirubin 0.5 mg/dL      eGFR Non African Amer 91 mL/min/1.73      Globulin 2.9 gm/dL      A/G Ratio 1.1 g/dL      BUN/Creatinine Ratio 21.7     Anion Gap 10.3 mmol/L     Narrative:      GFR Normal >60  Chronic Kidney Disease <60  Kidney Failure <15      CK [[id number]]  (Normal) Collected: 12/21/21 0016    Specimen: Blood Updated: 12/21/21 0115     Creatine Kinase 57 U/L     C-reactive Protein [[id number]]   (Abnormal) Collected: 12/21/21 0016    Specimen: Blood Updated: 12/21/21 0115     C-Reactive Protein 0.89 mg/dL     Phosphorus [[id number]]  (Normal) Collected: 12/21/21 0016    Specimen: Blood Updated: 12/21/21 0115     Phosphorus 2.9 mg/dL     Magnesium [[id number]]  (Normal) Collected: 12/21/21 0016    Specimen: Blood Updated: 12/21/21 0115     Magnesium 1.8 mg/dL     CBC & Differential [[id number]]  (Abnormal) Collected: 12/21/21 0016    Specimen: Blood Updated: 12/21/21 0043    Narrative:      The following orders were created for panel order CBC & Differential.  Procedure                               Abnormality         Status                     ---------                               -----------         ------                     CBC Auto Differential[[id number]]        Abnormal            Final result                 Please view results for these tests on the individual orders.    CBC Auto Differential [[id number]]  (Abnormal) Collected: 12/21/21 0016    Specimen: Blood Updated: 12/21/21 0043     WBC 6.96 10*3/mm3      RBC 4.56 10*6/mm3      Hemoglobin 13.5 g/dL      Hematocrit 41.2 %      MCV 90.4 fL      MCH 29.6 pg      MCHC 32.8 g/dL      RDW 13.6 %      RDW-SD 45.5 fl      MPV 9.8 fL      Platelets 220 10*3/mm3      Neutrophil % 81.1 %      Lymphocyte % 10.6 %      Monocyte % 7.9 %      Eosinophil % 0.0 %      Basophil % 0.0 %      Immature Grans % 0.4 %      Neutrophils, Absolute 5.64 10*3/mm3      Lymphocytes, Absolute 0.74 10*3/mm3      Monocytes, Absolute 0.55 10*3/mm3      Eosinophils, Absolute 0.00 10*3/mm3      Basophils, Absolute 0.00 10*3/mm3      Immature Grans, Absolute 0.03 10*3/mm3      nRBC 0.0 /100 WBC     Blood Culture - Blood, Arm, Left [[id number]]  (Normal) Collected: 12/18/21 2004    Specimen: Blood from Arm, Left Updated: 12/20/21 2030     Blood Culture No growth at 2 days    Blood Culture - Blood, Arm, Left [[id number]]  (Normal) Collected: 12/18/21 2011    Specimen: Blood from Arm,  Left Updated: 12/20/21 2030     Blood Culture No growth at 2 days    POC Glucose Once [[id number]]  (Abnormal) Collected: 12/20/21 1921    Specimen: Blood Updated: 12/20/21 1927     Glucose 230 mg/dL      Comment: Meter: [id number] : [id number] Ofelia Rodriguez       Urinalysis With Microscopic If Indicated (No Culture) - Urine, Clean Catch [[id number]]  (Abnormal) Collected: 12/20/21 1747    Specimen: Urine, Clean Catch Updated: 12/20/21 1813     Color, UA Yellow     Appearance, UA Clear     pH, UA 6.5     Specific Gravity, UA 1.025     Glucose,  mg/dL (1+)     Ketones, UA Trace     Bilirubin, UA Negative     Blood, UA Negative     Protein, UA Trace     Leuk Esterase, UA Negative     Nitrite, UA Negative     Urobilinogen, UA 1.0 E.U./dL    Narrative:      Urine microscopic not indicated.    POC Glucose Once [[id number]]  (Abnormal) Collected: 12/20/21 1631    Specimen: Blood Updated: 12/20/21 1644     Glucose 234 mg/dL      Comment: Meter: [id number] : [id number] ADINA PRAKASH       POC Glucose Once [[id number]]  (Abnormal) Collected: 12/20/21 1017    Specimen: Blood Updated: 12/20/21 1028     Glucose 209 mg/dL      Comment: Meter: [id number] : [id number] ADINA PRAKASH       POC Glucose Once [[id number]]  (Abnormal) Collected: 12/20/21 0641    Specimen: Blood Updated: 12/20/21 0656     Glucose 168 mg/dL      Comment: Meter: [id number] : [id number] ADINA PRAKASH       Comprehensive Metabolic Panel [[id number]]  (Abnormal) Collected: 12/20/21 0237    Specimen: Blood Updated: 12/20/21 0340     Glucose 188 mg/dL      BUN 15 mg/dL      Creatinine 0.75 mg/dL      Sodium 135 mmol/L      Potassium 4.5 mmol/L      Chloride 105 mmol/L      CO2 19.4 mmol/L      Calcium 8.8 mg/dL      Total Protein 6.2 g/dL      Albumin 3.14 g/dL      ALT (SGPT) 18 U/L      AST (SGOT) 19 U/L      Alkaline Phosphatase 63 U/L      Total Bilirubin 0.5 mg/dL      eGFR Non African Amer 102 mL/min/1.73      Globulin 3.1 gm/dL       A/G Ratio 1.0 g/dL      BUN/Creatinine Ratio 20.0     Anion Gap 10.6 mmol/L     Narrative:      GFR Normal >60  Chronic Kidney Disease <60  Kidney Failure <15      Phosphorus [[id number]]  (Normal) Collected: 12/20/21 0237    Specimen: Blood Updated: 12/20/21 0340     Phosphorus 3.1 mg/dL     Magnesium [[id number]]  (Normal) Collected: 12/20/21 0237    Specimen: Blood Updated: 12/20/21 0340     Magnesium 1.9 mg/dL     CBC & Differential [[id number]]  (Abnormal) Collected: 12/20/21 0237    Specimen: Blood Updated: 12/20/21 0315    Narrative:      The following orders were created for panel order CBC & Differential.  Procedure                               Abnormality         Status                     ---------                               -----------         ------                     CBC Auto Differential[[id number]]        Abnormal            Final result                 Please view results for these tests on the individual orders.    CBC Auto Differential [[id number]]  (Abnormal) Collected: 12/20/21 0237    Specimen: Blood Updated: 12/20/21 0315     WBC 5.20 10*3/mm3      RBC 4.52 10*6/mm3      Hemoglobin 13.6 g/dL      Hematocrit 40.5 %      MCV 89.6 fL      MCH 30.1 pg      MCHC 33.6 g/dL      RDW 13.8 %      RDW-SD 45.4 fl      MPV 9.8 fL      Platelets 188 10*3/mm3      Neutrophil % 75.6 %      Lymphocyte % 13.8 %      Monocyte % 10.0 %      Eosinophil % 0.0 %      Basophil % 0.2 %      Immature Grans % 0.4 %      Neutrophils, Absolute 3.93 10*3/mm3      Lymphocytes, Absolute 0.72 10*3/mm3      Monocytes, Absolute 0.52 10*3/mm3      Eosinophils, Absolute 0.00 10*3/mm3      Basophils, Absolute 0.01 10*3/mm3      Immature Grans, Absolute 0.02 10*3/mm3      nRBC 0.0 /100 WBC     POC Glucose Once [[id number]]  (Abnormal) Collected: 12/19/21 1908    Specimen: Blood Updated: 12/19/21 1914     Glucose 230 mg/dL      Comment: Meter: [id number] : [id number] SYDNEY LEA       POC Glucose Once [[id number]]  (Abnormal)  "Collected: 12/19/21 1618    Specimen: Blood Updated: 12/19/21 1626     Glucose 222 mg/dL      Comment: Meter: [id number] : [id number] ADINA RANDALL       Procalcitonin [[id number]]  (Normal) Collected: 12/18/21 2004    Specimen: Blood Updated: 12/19/21 1313     Procalcitonin 0.09 ng/mL     Narrative:      As a Marker for Sepsis (Non-Neonates):     1. <0.5 ng/mL represents a low risk of severe sepsis and/or septic shock.  2. >2 ng/mL represents a high risk of severe sepsis and/or septic shock.    As a Marker for Lower Respiratory Tract Infections that require antibiotic therapy:  PCT on Admission     Antibiotic Therapy             6-12 Hrs later  >0.5                          Strongly Recommended            >0.25 - <0.5             Recommended  0.1 - 0.25                  Discouraged                       Remeasure/reassess PCT  <0.1                         Strongly Discouraged         Remeasure/reassess PCT      As 28 day mortality risk marker: \"Change in Procalcitonin Result\" (>80% or <=80%) if Day 0 (or Day 1) and Day 4 values are available. Refer to http://www.[employer]-pct-calculator.com/    Change in PCT <=80 %   A decrease of PCT levels below or equal to 80% defines a positive change in PCT test result representing a higher risk for 28-day all-cause mortality of patients diagnosed with severe sepsis or septic shock.    Change in PCT >80 %   A decrease of PCT levels of more than 80% defines a negative change in PCT result representing a lower risk for 28-day all-cause mortality of patients diagnosed with severe sepsis or septic shock.                           Radiology:    Imaging Results (Last 72 Hours)     Procedure Component Value Units Date/Time    CT Chest Pulmonary Embolism [[id number]] Collected: 12/18/21 2205     Updated: 12/18/21 2208    Narrative:      CT CHEST WITH CONTRAST, PE PROTOCOL, 12/18/2021    HISTORY:  71-year-old male in the ED with shortness of air, cough and fever. Hypoxia. Positive " COVID-19 testing. Assess for pulmonary embolism complicating COVID-19 pneumonia.    TECHNIQUE:  CT examination of the chest with IV contrast. CTA MIP multiplanar pulmonary artery images were reformatted with 3-D postprocessing. Radiation dose reduction techniques included automated exposure control. Radiation audit for CT and nuclear cardiology  exams in the last 12 months: 2.    COMPARISON:  *  CT chest, 1/22/2021.    FINDINGS:  No pulmonary embolism is demonstrated. Thoracic aorta is normal in caliber with no aneurysm or dissection. Heart size is normal, there is no pericardial effusion.    Moderately extensive mild patchy groundglass opacities scattered throughout both lungs, typical for COVID-19 pneumonia in the current clinical setting. No dense airspace consolidation.    There is a tiny right pleural effusion with dependent right posterior lung base atelectasis. Trachea and central bronchi are patent. Mild to moderate diffuse centrilobular and paraseptal pulmonary emphysema.    Limited upper abdominal images show improved postinflammatory changes following prior acute pancreatitis seen on prior studies earlier this year. Cholecystectomy.      Impression:      1.  No evidence of pulmonary embolism or other acute vascular abnormality.  2.  Extensive mild diffuse groundglass pulmonary opacities in a pattern typical for COVID-19 pneumonia.  3.  Pulmonary emphysema.  4.  Tiny right pleural effusion.    Signer Name: Oscar Whaley MD   Signed: 12/18/2021 10:05 PM   Workstation Name: [email or web address]    [facility]    XR Chest AP [[id number]] Collected: 12/18/21 2043     Updated: 12/18/21 2045    Narrative:      CHEST X-RAY, 12/18/2021      HISTORY:    71-year-old male in the ED with shortness of air, cough and fever. Hypoxia. COVID-19 testing is pending.    TECHNIQUE:  AP portable upright chest x-ray.      FINDINGS:  Faint patchy opacities scattered throughout the mid and lower lungs  suspicious for mild multifocal pulmonary infiltrate. No dense airspace consolidation or pleural effusion.    Old bilateral rib fracture deformities with subpleural scarring in the lateral right midlung. Chronic elevation right hemidiaphragm. Heart size is normal.      Impression:      Faint patchy pulmonary opacities scattered within the mid and lower lungs concerning for mild multifocal pulmonary infiltrate.    Signer Name: Oscar Whaley MD   Signed: 12/18/2021 8:43 PM   Workstation Name: [facility]    [facility]              Assessment/Plan:   Acute hypoxic respiratory failure due to COVID-19 pneumonia.  Patient now on room air.  He is saturating at 92%.  He has received 3 doses of remdesivir, he is also on Decadron.  He will receive his fourth dose in the morning at 5 AM.  He remains on room air and otherwise doing well will consider discharge after this dosing.  We will reassess him tomorrow, CRP has improved significantly.  Procalcitonin remains normal with no evident secondary bacterial infection.    DVT prophylaxis, he is on tier 1 prophylaxis, he will need 1 week of Eliquis therapy on discharge    Hypertension, overall has been controlled up until the last check, will continue to follow on current medications.    Diabetes, marginal control, he is now 48 hours out from his CT scan, creatinine normal, on restarting Metformin.    History of non-Hodgkin's lymphoma.  Stable    Disposition Home possibly in the next 24 hours    Cem Padilla MD

## 2021-12-21 NOTE — PLAN OF CARE
Goal Outcome Evaluation:           Progress: improving   No s/s of any distress, no complaints voiced, will continue with current plan o fcare

## 2021-12-22 ENCOUNTER — READMISSION MANAGEMENT (OUTPATIENT)
Dept: CALL CENTER | Facility: HOSPITAL | Age: 71
End: 2021-12-22

## 2021-12-22 VITALS
DIASTOLIC BLOOD PRESSURE: 70 MMHG | OXYGEN SATURATION: 92 % | RESPIRATION RATE: 18 BRPM | BODY MASS INDEX: 26.68 KG/M2 | SYSTOLIC BLOOD PRESSURE: 128 MMHG | WEIGHT: 170 LBS | HEIGHT: 67 IN | TEMPERATURE: 97.9 F | HEART RATE: 70 BPM

## 2021-12-22 PROBLEM — D89.832 CYTOKINE RELEASE SYNDROME, GRADE 2: Status: ACTIVE | Noted: 2021-12-22

## 2021-12-22 LAB
ALBUMIN SERPL-MCNC: 3.15 G/DL (ref 3.5–5.2)
ALBUMIN/GLOB SERPL: 1 G/DL
ALP SERPL-CCNC: 69 U/L (ref 39–117)
ALT SERPL W P-5'-P-CCNC: 16 U/L (ref 1–41)
ANION GAP SERPL CALCULATED.3IONS-SCNC: 9.7 MMOL/L (ref 5–15)
AST SERPL-CCNC: 16 U/L (ref 1–40)
BASOPHILS # BLD AUTO: 0 10*3/MM3 (ref 0–0.2)
BASOPHILS NFR BLD AUTO: 0 % (ref 0–1.5)
BILIRUB SERPL-MCNC: 0.5 MG/DL (ref 0–1.2)
BUN SERPL-MCNC: 14 MG/DL (ref 8–23)
BUN/CREAT SERPL: 19.4 (ref 7–25)
CALCIUM SPEC-SCNC: 8.8 MG/DL (ref 8.6–10.5)
CHLORIDE SERPL-SCNC: 103 MMOL/L (ref 98–107)
CO2 SERPL-SCNC: 20.3 MMOL/L (ref 22–29)
CREAT SERPL-MCNC: 0.72 MG/DL (ref 0.76–1.27)
DEPRECATED RDW RBC AUTO: 43.8 FL (ref 37–54)
EOSINOPHIL # BLD AUTO: 0.01 10*3/MM3 (ref 0–0.4)
EOSINOPHIL NFR BLD AUTO: 0.2 % (ref 0.3–6.2)
ERYTHROCYTE [DISTWIDTH] IN BLOOD BY AUTOMATED COUNT: 13.4 % (ref 12.3–15.4)
GFR SERPL CREATININE-BSD FRML MDRD: 107 ML/MIN/1.73
GLOBULIN UR ELPH-MCNC: 3.2 GM/DL
GLUCOSE BLDC GLUCOMTR-MCNC: 132 MG/DL (ref 70–130)
GLUCOSE BLDC GLUCOMTR-MCNC: 148 MG/DL (ref 70–130)
GLUCOSE SERPL-MCNC: 154 MG/DL (ref 65–99)
HCT VFR BLD AUTO: 41.7 % (ref 37.5–51)
HGB BLD-MCNC: 14 G/DL (ref 13–17.7)
IMM GRANULOCYTES # BLD AUTO: 0.02 10*3/MM3 (ref 0–0.05)
IMM GRANULOCYTES NFR BLD AUTO: 0.3 % (ref 0–0.5)
LYMPHOCYTES # BLD AUTO: 0.87 10*3/MM3 (ref 0.7–3.1)
LYMPHOCYTES NFR BLD AUTO: 13.4 % (ref 19.6–45.3)
MAGNESIUM SERPL-MCNC: 1.9 MG/DL (ref 1.6–2.4)
MCH RBC QN AUTO: 29.6 PG (ref 26.6–33)
MCHC RBC AUTO-ENTMCNC: 33.6 G/DL (ref 31.5–35.7)
MCV RBC AUTO: 88.2 FL (ref 79–97)
MONOCYTES # BLD AUTO: 0.82 10*3/MM3 (ref 0.1–0.9)
MONOCYTES NFR BLD AUTO: 12.7 % (ref 5–12)
NEUTROPHILS NFR BLD AUTO: 4.75 10*3/MM3 (ref 1.7–7)
NEUTROPHILS NFR BLD AUTO: 73.4 % (ref 42.7–76)
NRBC BLD AUTO-RTO: 0 /100 WBC (ref 0–0.2)
PHOSPHATE SERPL-MCNC: 2.9 MG/DL (ref 2.5–4.5)
PLATELET # BLD AUTO: 216 10*3/MM3 (ref 140–450)
PMV BLD AUTO: 9.5 FL (ref 6–12)
POTASSIUM SERPL-SCNC: 4.2 MMOL/L (ref 3.5–5.2)
PROT SERPL-MCNC: 6.3 G/DL (ref 6–8.5)
RBC # BLD AUTO: 4.73 10*6/MM3 (ref 4.14–5.8)
SODIUM SERPL-SCNC: 133 MMOL/L (ref 136–145)
WBC NRBC COR # BLD: 6.47 10*3/MM3 (ref 3.4–10.8)

## 2021-12-22 PROCEDURE — 84100 ASSAY OF PHOSPHORUS: CPT | Performed by: INTERNAL MEDICINE

## 2021-12-22 PROCEDURE — 85025 COMPLETE CBC W/AUTO DIFF WBC: CPT | Performed by: INTERNAL MEDICINE

## 2021-12-22 PROCEDURE — 82962 GLUCOSE BLOOD TEST: CPT

## 2021-12-22 PROCEDURE — 80053 COMPREHEN METABOLIC PANEL: CPT | Performed by: INTERNAL MEDICINE

## 2021-12-22 PROCEDURE — 83735 ASSAY OF MAGNESIUM: CPT | Performed by: INTERNAL MEDICINE

## 2021-12-22 PROCEDURE — 25010000002 ENOXAPARIN PER 10 MG: Performed by: INTERNAL MEDICINE

## 2021-12-22 PROCEDURE — 63710000001 DEXAMETHASONE PER 0.25 MG: Performed by: INTERNAL MEDICINE

## 2021-12-22 PROCEDURE — 99239 HOSP IP/OBS DSCHRG MGMT >30: CPT | Performed by: INTERNAL MEDICINE

## 2021-12-22 RX ADMIN — NIFEDIPINE 60 MG: 60 TABLET, EXTENDED RELEASE ORAL at 08:12

## 2021-12-22 RX ADMIN — DEXAMETHASONE 6 MG: 4 TABLET ORAL at 08:13

## 2021-12-22 RX ADMIN — PANTOPRAZOLE SODIUM 40 MG: 40 TABLET, DELAYED RELEASE ORAL at 08:12

## 2021-12-22 RX ADMIN — SODIUM CHLORIDE, PRESERVATIVE FREE 3 ML: 5 INJECTION INTRAVENOUS at 08:13

## 2021-12-22 RX ADMIN — ASPIRIN 81 MG: 81 TABLET, COATED ORAL at 08:12

## 2021-12-22 RX ADMIN — METFORMIN HYDROCHLORIDE 500 MG: 500 TABLET ORAL at 08:12

## 2021-12-22 RX ADMIN — REMDESIVIR 100 MG: 100 INJECTION, POWDER, LYOPHILIZED, FOR SOLUTION INTRAVENOUS at 05:57

## 2021-12-22 RX ADMIN — ATENOLOL 25 MG: 25 TABLET ORAL at 08:12

## 2021-12-22 RX ADMIN — ENOXAPARIN SODIUM 40 MG: 40 INJECTION SUBCUTANEOUS at 08:12

## 2021-12-22 NOTE — CASE MANAGEMENT/SOCIAL WORK
Discharge Planning Assessment   Ji     Patient Name: Drake Brandon  MRN: [id number]  Today's Date: 12/22/2021    Admit Date: 12/18/2021          Discharge Plan     Row Name 12/22/21 1156       Plan    Final Discharge Disposition Code 01 - home or self-care    Final Note Pt discharged home.  CM arranged DME.    Row Name 12/22/21 1100       Plan    Plan Pt to be dc'd home on this date with MD referral for home O2@2lnc during sleep & prn. Per pt & wife, no DME preference ntd. CM faxed MD referral for home O2 to [facility] @ [phone], Per Nicole, fax received and port.tank will be delivered to pts hosp. room on this date for transport home.    Row Name 12/22/21 0939       Plan    Plan Comments Enhanced iso, Afeb., sats 95%  on ra, decadron, lovenox, accuk/ssic, remdesivir. BC-NGTD              Katia Cruz, BSW

## 2021-12-22 NOTE — CASE MANAGEMENT/SOCIAL WORK
Discharge Planning Assessment   Ji     Patient Name: Drake Brandon  MRN: [id number]  Today's Date: 12/22/2021    Admit Date: 12/18/2021     Discharge Needs Assessment    No documentation.                Discharge Plan     Row Name 12/22/21 1100       Plan    Plan Pt to be dc'd home on this date with MD referral for home O2@2lnc during sleep & prn. Per pt & wife, no DME preference ntd. CM faxed MD referral for home O2 to [facility] [place] @ [phone], Per Nicole, fax received and port.tank will be delivered to pts hosp. room on this date for transport home.    Row Name 12/22/21 0939       Plan    Plan Comments Enhanced iso, Afeb., sats 95%  on ra, decadron, lovenox, accuk/ssic, remdesivir. BC-NGTD              Continued Care and Services - Admitted Since 12/18/2021    Coordination has not been started for this encounter.       Expected Discharge Date and Time     Expected Discharge Date Expected Discharge Time    Dec 22, 2021          Demographic Summary    No documentation.                Functional Status    No documentation.                Psychosocial    No documentation.                Abuse/Neglect    No documentation.                Legal    No documentation.                Substance Abuse    No documentation.                Patient Forms    No documentation.                   Bisi Lin RN

## 2021-12-22 NOTE — DISCHARGE PLACEMENT REQUEST
"Drake Brandon (71 y.o. Male)             Date of Birth Social Security Number Address Home Phone MRN    1950  [address] [place] [address] [phone] [id number]    Yazidi Marital Status             [employer]        Admission Date Admission Type Admitting Provider Attending Provider Department, Room/Bed    12/18/21 Emergency Damaso Zapien MD Heinss, Karl F, MD [address], 3311/1S    Discharge Date Discharge Disposition Discharge Destination           Home or Self Care              Attending Provider: Cem Padilla MD    Allergies: Morphine    Isolation: Enh Drop/Con   Infection: COVID (confirmed) (12/18/21)   Code Status: CPR   Advance Care Planning Activity    Ht: 170.2 cm (67\")   Wt: 77.1 kg (170 lb)    Admission Cmt: None   Principal Problem: COVID-19 [U07.1]                 Active Insurance as of 12/18/2021     Primary Coverage     Payor Plan Insurance Group Employer/Plan Group    MEDICARE MEDICARE A & B      Payor Plan Address Payor Plan Phone Number Payor Plan Fax Number Effective Dates    [address] [phone]  8/1/1995 - None Entered    [place] [address]       Subscriber Name Subscriber Birth Date Member ID       DRAKE BRANDON 1950 [id number]                 Emergency Contacts      (Rel.) Home Phone Work Phone Mobile Phone    KRYSTEN BRANDON (Spouse) -- -- [phone]        Cem Padilla MD   Physician   Medicine   Discharge Summary       Addendum   Date of Service:  12/22/21 1016   Creation Time:  12/22/21 1016                    Date of admission: 12/18/2021  Date of discharge: 12/22 x 21     Principal diagnosis: Acute hypoxic respiratory failure secondary to COVID-19 pneumonia  Secondary diagnoses:  -Diabetes type II well controlled A1c 6.7  -Hypertension  -History of non-Hodgkin's lymphoma  -Elevated ferritin at 2300 most likely related to Covid.  Can be followed up as an outpatient, patient was not polycythemic and " liver enzymes normal     Procedures:  CT PE protocol showin.  No evidence of pulmonary embolism or other acute vascular abnormality.  2.  Extensive mild diffuse groundglass pulmonary opacities in a pattern typical for COVID-19 pneumonia.  3.  Pulmonary emphysema.  4.  Tiny right pleural effusion.     Exam: Patient was seen in Covid isolation, he is on room air with good saturations in the mid 90s, he did require some oxygen through the night however apparently desaturated to the night although he may have an element of sleep apnea.  He states he is having no cough appetite is good no complaints.  Lungs have lateral breath sounds good air movement clear, heart regular rate and rhythm without murmur, abdomen soft nontender extremities are without edema.  Monitor showing a sinus rhythm.  Vital signs: 132/70, 18, 79, 97, room air saturation 96%     Hospital course: Patient was admitted with hypoxic failure due to COVID-19 pneumonia.  He was treated with remdesivir and Decadron.  He has done very well with this, he is actually mostly staying on room air although at night he is requiring some oxygen.  He is asymptomatic now, he has had 4 doses of the remdesivir, I do believe he is okay to discharge, I am going to give him 1 week of Eliquis 2.5 twice daily for tier 1 coverage.  See chart for full details of this visit.     Follow-up:  PCP 1 week video visit if possible     Activity: As tolerated with CDC guidelines for isolation     Diet: Constant carbohydrate     Condition: Stable/improved     Home O2 2 L to wear at night and as needed     Disposition: Home     Medication:  Eliquis 2.5 mg twice daily for 7 days  Aspirin 81 mg a day  Atenolol 25 mg a day  Metformin 500 mg a day  Nifedipine CC 60 mg a day  Protonix 40 mg twice daily  Zetia 10 mg a day     Greater than 30-minute discharge                   Revision History                                Routing History                          [facility]  "[address] [address]  Dept. Phone:  [phone]  Dept. Fax:  [phone] Date Ordered: Dec 22, 2021         Patient:  Drake Brandon MRN:  [id number]   [address] [place] [id number] :  1950  SSN:    Phone: [phone] Sex:  M     Weight: 77.1 kg (170 lb)         Ht Readings from Last 1 Encounters:   21 170.2 cm (67\")         Home Oxygen Therapy          (Order ID: [id number])    Diagnosis:  COVID-19 (U07.1 [ICD-10-CM] 079.89 [ICD-9-CM])   Quantity:  1     Delivery Modality: Nasal Cannula  Liters Per Minute: 2  Duration: During Sleep  Duration: PRN  Equipment:  Oxygen Concentrator &  &  Portable Gaseous Oxygen System & Portable Oxygen Contents Gaseous &  Conserving Regulator  Length of Need (99 Months = Lifetime): 3 Months        Authorizing Provider's Phone: [phone]  Authorizing Provider:Cem Padilla MD  Authorizing Provider's NPI: [id number]  Order Entered By: Cem Padilla MD 2021 10:15 AM     Electronically signed by: Cem Padilla MD 2021 10:15 AM            History & Physical      Damaso Zapien MD at [facility]              [facility]    Patient Identification:  Name:  Drake Brandon  Age:  71 y.o.  Sex:  male  :  1950  MRN:  [id number]   Visit Number:  [id number]  Admit Date: 2021   Room number:  3311/1S  Primary Care Physician:  Leland Sinha PA    Date of Admission: 2021     Subjective     Chief complaint:    Chief Complaint   Patient presents with   • Shortness of Breath   • Exposure To Known Illness     History of presenting illness:  71 y.o. male who was admitted on 2021 from the ED with shortness of air. The patient has a past medical history non-Hodgkin's lymphoma, which has been treated twice.  The last time he was treated for non-Hodgkin's lymphoma was about 1 year prior to this admission.  Patient states that he underwent " cholecystectomy and was found to have some acute pancreatitis.  Endoscopy showed an ulcer in his stomach, with biopsy demonstrating non-Hodgkin's lymphoma.  The patient states that he received radiation at that time with no chemotherapy.  Patient also has a history of type 2 diabetes mellitus and essential hypertension.  [facility] [place]  The patient has been sick for about 1.5 weeks with symptoms of a productive cough, fever, fatigue, appetite change, chills, sweating.  He saw his primary care provider on day 7 of illness; at that time, COVID-19 testing was negative.  He was given 3 days of azithromycin.  The patient has been monitoring his oxygen levels at home; on the day of 12/17/2021, his saturations were noted to be 85 to 88%.  The patient then went to see his primary care provider on 12/18/2021; the patient was encouraged to be admitted to [facility] but the patient declined transfer and wanted to come closer to a facility that provided cancer care; when asked who [facility].  In the emergency department, the patient was found to have bilateral patchy infiltrates on CT scan.  On room air, his PaO2 was 49.8%.  Therefore, the patient was admitted to the telemetry floor for further evaluation and treatment.  ---------------------------------------------------------------------------------------------------------------------   Review of Systems   Constitutional: Positive for appetite change, chills, diaphoresis, fatigue and fever.   HENT: Negative for congestion and rhinorrhea.         Partial loss of taste   Eyes: Negative for discharge and redness.   Respiratory: Positive for cough (Productive of thick, green/yellow/brown sputum intermittently). Negative for shortness of breath.    Cardiovascular: Negative for chest pain and leg swelling.   Gastrointestinal: Positive for diarrhea. Negative for nausea and vomiting.    Genitourinary: Negative for dysuria and hematuria.   Musculoskeletal: Negative for arthralgias.   Skin: Positive for rash (Red, itching, bumps on his bilateral arms that is now resolved). Negative for pallor and wound.   Neurological: Negative for syncope, facial asymmetry, speech difficulty, weakness, light-headedness and headaches.   Psychiatric/Behavioral: Negative for agitation, behavioral problems and confusion.     ---------------------------------------------------------------------------------------------------------------------   Past Medical History:   Diagnosis Date   • Diabetes mellitus (HCC)    • Elevated cholesterol    • GERD (gastroesophageal reflux disease)    • Hypertension    • Non Hodgkin's lymphoma (HCC)     In remission     Past Surgical History:   Procedure Laterality Date   • EYE SURGERY      cataract   • LUNG BIOPSY       Family History   Problem Relation Age of Onset   • Diabetes Mother    • Cancer Father      Social History     Socioeconomic History   • Marital status:    Tobacco Use   • Smoking status: Former Smoker     Types: Cigarettes     Start date: 1980     Quit date: 1981     Years since quittin.0   • Smokeless tobacco: Never Used   Vaping Use   • Vaping Use: Never used   Substance and Sexual Activity   • Alcohol use: Never   • Drug use: Never   • Sexual activity: Defer     ---------------------------------------------------------------------------------------------------------------------   Allergies:  Morphine  ---------------------------------------------------------------------------------------------------------------------   Medications below are reported home medications pulling from within the system; at this time, these medications have not been reconciled unless otherwise specified and are in the verification process for further verifcation as current home medications.      Prior to Admission Medications     None        Objective     Vital  Signs:  Temp:  [97.3 °F (36.3 °C)-97.8 °F (36.6 °C)] 97.3 °F (36.3 °C)  Heart Rate:  [79-90] 79  Resp:  [18] 18  BP: (115-162)/(58-67) 162/58    Mean Arterial Pressure (Non-Invasive) for the past 24 hrs (Last 3 readings):   Noninvasive MAP (mmHg)   12/18/21 2016 88     SpO2:  [88 %-95 %] 95 %  on  Flow (L/min):  [2] 2;   Device (Oxygen Therapy): nasal cannula  Body mass index is 27.57 kg/m².    Wt Readings from Last 3 Encounters:   12/19/21 79.8 kg (176 lb)      ----------------------------------------------------------------------------------------------------------------------  Physical Exam  Vitals reviewed.   Constitutional:       General: He is in acute distress.      Appearance: Normal appearance. He is well-developed and overweight. He is not ill-appearing, toxic-appearing or diaphoretic.      Comments: Wearing 2 L/min of nasal cannula oxygen.   HENT:      Head: Normocephalic and atraumatic.      Right Ear: External ear normal.      Left Ear: External ear normal.   Eyes:      General: No scleral icterus.        Right eye: No discharge.         Left eye: No discharge.      Pupils: Pupils are equal, round, and reactive to light.   Cardiovascular:      Rate and Rhythm: Normal rate and regular rhythm.      Pulses: Normal pulses.      Heart sounds: No murmur heard.      Pulmonary:      Effort: Respiratory distress present. No accessory muscle usage or prolonged expiration.      Breath sounds: Decreased breath sounds present. No wheezing or rales.   Abdominal:      General: Bowel sounds are normal. There is no distension.      Palpations: Abdomen is soft.   Musculoskeletal:         General: No swelling, deformity or signs of injury.   Skin:     Capillary Refill: Capillary refill takes less than 2 seconds.      Coloration: Skin is not jaundiced.      Findings: No bruising or rash.   Neurological:      Mental Status: He is alert and oriented to person, place, and time. Mental status is at baseline.      Cranial  Nerves: No cranial nerve deficit.   Psychiatric:         Mood and Affect: Mood normal.         Behavior: Behavior normal. Behavior is cooperative.         Thought Content: Thought content normal.         Judgment: Judgment normal.       ---------------------------------------------------------------------------------------------------------------------  EKG: Normal sinus rhythm with a heart rate 81 and a  ms. There is low voltage on EKG but there are no ischemic changes. Please note that there are no comparison EKGs in our computer system nor and care everywhere. Please note that I have personally looked at the EKG from this admission and the above is my interpretation of this admission's EKG; I await the final cardiology read.      Telemetry: Normal sinus rhythm with heart rate 70s to 80s. Please note that I personally looked at the telemetry strips.  --------------------------------------------------------------------------------------------------------------------  LABS:    CBC and coagulation:  Results from last 7 days   Lab Units 12/18/21 2004   LACTATE mmol/L 1.2   CRP mg/dL 5.48*   WBC 10*3/mm3 4.15   HEMOGLOBIN g/dL 14.3   HEMATOCRIT % 44.1   MCV fL 90.9   MCHC g/dL 32.4   PLATELETS 10*3/mm3 143   D DIMER QUANT MCGFEU/mL 1.72*     Acid/base balance:  Results from last 7 days   Lab Units 12/18/21 2113 12/18/21 1917   PH, ARTERIAL pH units 7.508* 7.499*   PO2 ART mm Hg 71.4* 49.8*   PCO2, ARTERIAL mm Hg 30.0* 30.4*   HCO3 ART mmol/L 23.8 23.6     Renal and electrolytes:  Results from last 7 days   Lab Units 12/19/21  0548 12/18/21 2004   SODIUM mmol/L  --  135*   POTASSIUM mmol/L  --  4.1   CHLORIDE mmol/L  --  100   CO2 mmol/L  --  24.4   BUN mg/dL  --  11   CREATININE mg/dL 0.88 0.85   EGFR IF NONAFRICN AM mL/min/1.73 85 89   CALCIUM mg/dL  --  9.2   GLUCOSE mg/dL  --  134*     Estimated Creatinine Clearance: 76.8 mL/min (by C-G formula based on SCr of 0.88 mg/dL).    Liver and pancreatic  function:  Results from last 7 days   Lab Units 12/19/21  0548 12/18/21 2004   ALBUMIN g/dL 3.20* 3.60   BILIRUBIN mg/dL 0.4 0.5   ALK PHOS U/L 72 77   AST (SGOT) U/L 22 28   ALT (SGPT) U/L 18 24     Endocrine function:  Lab Results   Component Value Date    HGBA1C 6.70 (H) 12/18/2021     Lab Results   Component Value Date    TSH 1.970 12/18/2021     Cardiac:  Results from last 7 days   Lab Units 12/18/21 2004   TROPONIN T ng/mL <0.010   PROBNP pg/mL 587.0       Cultures:  Microbiology Results (last 10 days)     Procedure Component Value - Date/Time    Respiratory Panel PCR w/COVID-19(SARS-CoV-2) CARLOS/LEI/VESNA/PAD/COR/MAD/JB In-House, NP Swab in UTM/VTM, 3-4 HR TAT - Swab, Nasopharynx [[id number]]  (Abnormal) Collected: 12/18/21 2013    Lab Status: Final result Specimen: Swab from Nasopharynx Updated: 12/18/21 2117     ADENOVIRUS, PCR Not Detected     Coronavirus 229E Not Detected     Coronavirus HKU1 Not Detected     Coronavirus NL63 Not Detected     Coronavirus OC43 Not Detected     COVID19 Detected     Human Metapneumovirus Not Detected     Human Rhinovirus/Enterovirus Not Detected     Influenza A PCR Not Detected     Influenza B PCR Not Detected     Parainfluenza Virus 1 Not Detected     Parainfluenza Virus 2 Not Detected     Parainfluenza Virus 3 Not Detected     Parainfluenza Virus 4 Not Detected     RSV, PCR Not Detected     Bordetella pertussis pcr Not Detected     Bordetella parapertussis PCR Not Detected     Chlamydophila pneumoniae PCR Not Detected     Mycoplasma pneumo by PCR Not Detected    Narrative:      In the setting of a positive respiratory panel with a viral infection PLUS a negative procalcitonin without other underlying concern for bacterial infection, consider observing off antibiotics or discontinuation of antibiotics and continue supportive care. If the respiratory panel is positive for atypical bacterial infection (Bordetella pertussis, Chlamydophila pneumoniae, or Mycoplasma pneumoniae),  consider antibiotic de-escalation to target atypical bacterial infection.          I have personally looked at the labs and they are summarized above.  ----------------------------------------------------------------------------------------------------------------------  Detailed radiology reports for the last 24 hours:    Imaging Results (Last 24 Hours)     Procedure Component Value Units Date/Time    CT Chest Pulmonary Embolism [[id number]] Collected: 12/18/21 2205     Updated: 12/18/21 2208    Narrative:      CT CHEST WITH CONTRAST, PE PROTOCOL, 12/18/2021    HISTORY:  71-year-old male in the ED with shortness of air, cough and fever. Hypoxia. Positive COVID-19 testing. Assess for pulmonary embolism complicating COVID-19 pneumonia.    TECHNIQUE:  CT examination of the chest with IV contrast. CTA MIP multiplanar pulmonary artery images were reformatted with 3-D postprocessing. Radiation dose reduction techniques included automated exposure control. Radiation audit for CT and nuclear cardiology  exams in the last 12 months: 2.    COMPARISON:  *  CT chest, 1/22/2021.    FINDINGS:  No pulmonary embolism is demonstrated. Thoracic aorta is normal in caliber with no aneurysm or dissection. Heart size is normal, there is no pericardial effusion.    Moderately extensive mild patchy groundglass opacities scattered throughout both lungs, typical for COVID-19 pneumonia in the current clinical setting. No dense airspace consolidation.    There is a tiny right pleural effusion with dependent right posterior lung base atelectasis. Trachea and central bronchi are patent. Mild to moderate diffuse centrilobular and paraseptal pulmonary emphysema.    Limited upper abdominal images show improved postinflammatory changes following prior acute pancreatitis seen on prior studies earlier this year. Cholecystectomy.      Impression:      1.  No evidence of pulmonary embolism or other acute vascular abnormality.  2.  Extensive mild diffuse  groundglass pulmonary opacities in a pattern typical for COVID-19 pneumonia.  3.  Pulmonary emphysema.  4.  Tiny right pleural effusion.    Signer Name: Oscar Whaley MD   Signed: 12/18/2021 10:05 PM   Workstation Name: [email or web address]    [facility]    XR Chest AP [[id number]] Collected: 12/18/21 2043     Updated: 12/18/21 2045    Narrative:      CHEST X-RAY, 12/18/2021      HISTORY:    71-year-old male in the ED with shortness of air, cough and fever. Hypoxia. COVID-19 testing is pending.    TECHNIQUE:  AP portable upright chest x-ray.      FINDINGS:  Faint patchy opacities scattered throughout the mid and lower lungs suspicious for mild multifocal pulmonary infiltrate. No dense airspace consolidation or pleural effusion.    Old bilateral rib fracture deformities with subpleural scarring in the lateral right midlung. Chronic elevation right hemidiaphragm. Heart size is normal.      Impression:      Faint patchy pulmonary opacities scattered within the mid and lower lungs concerning for mild multifocal pulmonary infiltrate.    Signer Name: Oscar Whaley MD   Signed: 12/18/2021 8:43 PM   Workstation Name: [email or web address]    [facility]          I have personally looked at the radiology images and I have read the available final reports.    Assessment & Plan       -Acute hypoxic respiratory failure due to bilateral COVID-19 pneumonia  -History of acute pancreatitis with a 5.5 cm mass that appears to be anterior to the head of the pancreas  -History of non-Hodgkin's lymphoma for which he received chemotherapy once and radiation twice  -History of type 2 diabetes mellitus  -History of essential hypertension  -Prolonged QTC of 455 ms    Admitted to the telemetry floor.  The patient is on day 10 of symptoms, and due to his age and possible immunocompromise status, I have decided to treat him with remdesivir.  We will also treat him with Decadron.  We will give him  nasal cannula oxygen and wean it for saturations of 92% or above.  For his type 2 diabetes mellitus, we will continue his home Metformin as his kidney function is adequate at this time.  We will measure his glucose levels 4 times a day at bedside; if his glucose levels are above 200, then we will start him on a low-dose sliding scale of NovoLog.  Please note that the Decadron may increase his glucose levels.  I have continued his home atenolol and nifedipine.  We will continue to monitor his blood pressures and heart rates closely.  We will avoid QT prolonging agents and continue to monitor the electrolytes closely; we will replace the potassium and magnesium per our protocols if these levels are low during this hospitalization.  Goal potassium level is 4-4.5 and goal magnesium level is 2-2.2.  Please note the patient has been placed in enhanced COVID-19 isolation.    VTE Prophylaxis:   Mechanical Order History:     None      Pharmalogical Order History:      Ordered     Dose Route Frequency Stop    12/19/21 0157  enoxaparin (LOVENOX) syringe 40 mg         0.5 mg/kg SC Daily --    12/19/21 0156  Pharmacy to Dose enoxaparin (LOVENOX)        Question:  Indication of use  Answer:  Prophylaxis    -- XX Continuous PRN --              The patient is high risk due to the following diagnoses/reasons: Acute hypoxic respiratory failure due to bilateral COVID-19    Disposition: Anticipate home    Damaso Zapien MD  [facility]  12/19/21  06:41 EST        Electronically signed by Damaso Zapien MD at 12/19/21 0678

## 2021-12-22 NOTE — DISCHARGE SUMMARY
Date of admission: 2021  Date of discharge: 12/22 x 21    Principal diagnosis: Acute hypoxic respiratory failure secondary to COVID-19 pneumonia  Secondary diagnoses:  -Diabetes type II well controlled A1c 6.7  -Hypertension  -History of non-Hodgkin's lymphoma  -Elevated ferritin at 2300 most likely related to Covid.  Can be followed up as an outpatient, patient was not polycythemic and liver enzymes normal    Procedures:  CT PE protocol showin.  No evidence of pulmonary embolism or other acute vascular abnormality.  2.  Extensive mild diffuse groundglass pulmonary opacities in a pattern typical for COVID-19 pneumonia.  3.  Pulmonary emphysema.  4.  Tiny right pleural effusion.    Exam: Patient was seen in [facility] isolation, he is on room air with good saturations in the mid 90s, he did require some oxygen through the night however apparently desaturated to the night although he may have an element of sleep apnea.  He states he is having no cough appetite is good no complaints.  Lungs have lateral breath sounds good air movement clear, heart regular rate and rhythm without murmur, abdomen soft nontender extremities are without edema.  Monitor showing a sinus rhythm.  Vital signs: 132/70, 18, 79, 97, room air saturation 96%    Hospital course: Patient was admitted with hypoxic failure due to COVID-19 pneumonia.  He was treated with remdesivir and Decadron.  He has done very well with this, he is actually mostly staying on room air although at night he is requiring some oxygen.  He is asymptomatic now, he has had 4 doses of the remdesivir, I do believe he is okay to discharge, I am going to give him 1 week of Eliquis 2.5 twice daily for tier 1 coverage.  See chart for full details of this visit.    Follow-up:  PCP 1 week video visit if possible    Activity: As tolerated with CDC guidelines for isolation    Diet: Constant carbohydrate    Condition: Stable/improved    Home O2 2 L to wear at night and as  needed    Disposition: Home    Medication:  Eliquis 2.5 mg twice daily for 7 days  Aspirin 81 mg a day  Atenolol 25 mg a day  Metformin 500 mg a day  Nifedipine CC 60 mg a day  Protonix 40 mg twice daily  Zetia 10 mg a day    Greater than 30-minute discharge

## 2021-12-22 NOTE — DISCHARGE INSTR - APPOINTMENTS
Pt  has  a  [facility]  for  December 27  at  3:15   with  lou swan     and  respiratory  will bring  a  pulse  oz

## 2021-12-22 NOTE — PLAN OF CARE
Goal Outcome Evaluation:  Plan of Care Reviewed With: patient        Progress: improving   Pt with no s/s of any distress, no complaints voiced, will continue with current plan of care

## 2021-12-22 NOTE — NURSING NOTE
Pt's D/C is complete; waiting on pharmacy to bring pt's meds up and waiting on O2 tank to be delivered from [place].

## 2021-12-22 NOTE — PLAN OF CARE
"Goal Outcome Evaluation:  Plan of Care Reviewed With: patient        Progress: improving  Outcome Summary: Pt currently sitting up in bed watching television. V/S stable with no signs of respiratory distress present. Pt has maintained O2 saturation 92% or greater on room air. Denies any complaints at this time. Pt ambulates well independently in room. Pt does report getting \"anxious\" frequently, but also states that's normal for him. Pt getting D/C'd per Dr. Padilla.  "

## 2021-12-23 ENCOUNTER — READMISSION MANAGEMENT (OUTPATIENT)
Dept: CALL CENTER | Facility: HOSPITAL | Age: 71
End: 2021-12-23

## 2021-12-23 LAB
BACTERIA SPEC AEROBE CULT: NORMAL
BACTERIA SPEC AEROBE CULT: NORMAL

## 2021-12-23 NOTE — OUTREACH NOTE
Prep Survey      Responses   [facility] facility patient discharged from? [facility]   Is LACE score < 7 ? No   Emergency Room discharge w/ pulse ox? No   Eligibility Readm Mgmt   Does the patient have one of the following disease processes/diagnoses(primary or secondary)? COVID-19   Does the patient have Home health ordered? No   Is there a DME ordered? Yes   What DME was ordered? [facility]   O2   Prep survey completed? Yes          Amie Hubbard RN

## 2021-12-23 NOTE — OUTREACH NOTE
"COVID-19 Week 1 Survey      Responses   [facility] patient discharged from? [facility]   Does the patient have one of the following disease processes/diagnoses(primary or secondary)? COVID-19   COVID-19 underlying condition? None   Call Number Call 1   Week 1 Call successful? Yes   Call start time 1241   Call end time 1243   Meds reviewed with patient/caregiver? Yes   Is the patient having any side effects they believe may be caused by any medication additions or changes? No   Does the patient have all medications ordered at discharge? Yes   Is the patient taking all medications as directed (includes completed medication regime)? Yes   Does the patient have a primary care provider?  Yes   Comments regarding PCP PATIENT HAD A PHONE APPOINTMENT WITH HIS PCP THIS MORNING TO \"GO OVER SOME THINGS,\" AND HE HAS AN IN PERSON APPOINTMENT NEXT WEEK.    Has the patient kept scheduled appointments due by today? Yes   Has home health visited the patient within 72 hours of discharge? N/A   What DME was ordered? [facility] in [facility]   O2   Has all DME been delivered? Yes   DME comments PATIENT STATES HE IS ONLY USING HIS O2 AT NIGHT TO SLEEP   Psychosocial issues? No   Did the patient receive a copy of their discharge instructions? Yes   Did the patient receive a copy of COVID-19 specific instructions? No   Nursing interventions Reviewed instructions with patient   What is the patient's perception of their health status since discharge? Improving   Does the patient have any of the following symptoms? None   Nursing Interventions Nurse provided patient education   Pulse Ox monitoring Intermittent   Pulse Ox device source Patient   O2 Sat comments PATIENT STATES HIS O2 SAT IS 96-97%   O2 Sat: education provided Sat levels,  Monitoring frequency   Is the patient/caregiver able to teach back steps to recovery at home? Set small, achievable goals for return to baseline health,  Rest and rebuild strength, gradually " increase activity,  Practice good oral hygiene,  Eat a well-balance diet,  Make a list of questions for provider's appointment   If the patient is a current smoker, are they able to teach back resources for cessation? Not a smoker   Is the patient/caregiver able to teach back the hierarchy of who to call/visit for symptoms/problems? PCP, Specialist, Home health nurse, Urgent Care, ED, 911 Yes   COVID-19 call completed? Yes          Elizabeth Holman LPN

## 2021-12-24 ENCOUNTER — READMISSION MANAGEMENT (OUTPATIENT)
Dept: CALL CENTER | Facility: HOSPITAL | Age: 71
End: 2021-12-24

## 2021-12-24 NOTE — OUTREACH NOTE
COVID-19 Week 1 Survey      Responses   [facility] patient discharged from? [facility]   Does the patient have one of the following disease processes/diagnoses(primary or secondary)? COVID-19   COVID-19 underlying condition? None   Call Number Call 2   Week 1 Call successful? Yes   Call start time 0914   Call end time 0917   Discharge diagnosis Covid 19 PNA   Person spoke with today (if not patient) and relationship patient   Meds reviewed with patient/caregiver? Yes   Is the patient having any side effects they believe may be caused by any medication additions or changes? No   Does the patient have all medications ordered at discharge? Yes   Is the patient taking all medications as directed (includes completed medication regime)? Yes   Does the patient have a primary care provider?  Yes   Comments regarding PCP Pt had telehealth appt on 12/23   Does the patient have an appointment with their PCP or specialist within 7 days of discharge? Yes   Has the patient kept scheduled appointments due by today? Yes   What DME was ordered? [facility] in [facility]   O2   Has all DME been delivered? Yes   DME comments PATIENT STATES HE IS ONLY USING HIS O2 AT NIGHT TO SLEEP   Psychosocial issues? No   Did the patient receive a copy of their discharge instructions? Yes   Did the patient receive a copy of COVID-19 specific instructions? No   What is the patient's perception of their health status since discharge? Improving   Does the patient have any of the following symptoms? None   Pulse Ox monitoring Intermittent   Pulse Ox device source Patient   O2 Sat comments PATIENT STATES HIS O2 SAT IS 96-97% this morning   O2 Sat: education provided Sat levels,  Monitoring frequency,  When to seek care   If the patient is a current smoker, are they able to teach back resources for cessation? Not a smoker   Is the patient/caregiver able to teach back the hierarchy of who to call/visit for symptoms/problems? PCP, Specialist, Home  "health nurse, Urgent Care, ED, 911 Yes   COVID-19 call completed? Yes   Wrap up additional comments Patient states he did well through the night.  He does have a mild cough that is productive.  He feels he is \"on the mend\".  Denies needs at this time.           Ani Posada RN  "

## 2021-12-26 ENCOUNTER — READMISSION MANAGEMENT (OUTPATIENT)
Dept: CALL CENTER | Facility: HOSPITAL | Age: 71
End: 2021-12-26

## 2021-12-26 NOTE — OUTREACH NOTE
COVID-19 Week 1 Survey      Responses   [facility] patient discharged from? [facility]   Does the patient have one of the following disease processes/diagnoses(primary or secondary)? COVID-19   COVID-19 underlying condition? None   Call Number Call 3   Week 1 Call successful? Yes   Call start time 0951   Call end time 0955   Discharge diagnosis Covid 19 PNA   Person spoke with today (if not patient) and relationship patient   Meds reviewed with patient/caregiver? Yes   Is the patient having any side effects they believe may be caused by any medication additions or changes? No   Does the patient have all medications ordered at discharge? Yes   Is the patient taking all medications as directed (includes completed medication regime)? Yes   Does the patient have a primary care provider?  Yes   Comments regarding PCP Pt had telehealth appt on 12/23   Does the patient have an appointment with their PCP or specialist within 7 days of discharge? Yes   Has the patient kept scheduled appointments due by today? Yes   Has home health visited the patient within 72 hours of discharge? N/A   What DME was ordered? [facility]   O2   Has all DME been delivered? Yes   DME comments PATIENT STATES HE IS ONLY USING HIS O2 AT NIGHT TO SLEEP   Psychosocial issues? No   Did the patient receive a copy of their discharge instructions? Yes   Did the patient receive a copy of COVID-19 specific instructions? Yes   Nursing interventions Reviewed instructions with patient   What is the patient's perception of their health status since discharge? Improving   Does the patient have any of the following symptoms? None   Nursing Interventions Nurse provided patient education   Pulse Ox monitoring Intermittent   Pulse Ox device source Patient   O2 Sat comments PATIENT STATES HIS O2 SAT IS 98% this morning   O2 Sat: education provided Sat levels   Is the patient/caregiver able to teach back steps to recovery at home? Set small,  achievable goals for return to baseline health,  Rest and rebuild strength, gradually increase activity,  Eat a well-balance diet   If the patient is a current smoker, are they able to teach back resources for cessation? Not a smoker   Is the patient/caregiver able to teach back the hierarchy of who to call/visit for symptoms/problems? PCP, Specialist, Home health nurse, Urgent Care, ED, 911 Yes   COVID-19 call completed? Yes   Wrap up additional comments PATIENT STATES MUCH IMPROVED, REMAINS ALITTLE TIRED BUT RESUMING ADLS, CONTINUES TO USE O2 AT NIGHT STATES 'IT MAKES ME REST BETTER, YOU KNOW, JUST INCASE', TOLERATING PO INTAKE, DENIES QUESTIONS OR CONCERNS, NAD NOTED.          Yvette Hare RN

## 2021-12-29 ENCOUNTER — READMISSION MANAGEMENT (OUTPATIENT)
Dept: CALL CENTER | Facility: HOSPITAL | Age: 71
End: 2021-12-29

## 2021-12-29 NOTE — OUTREACH NOTE
Prep Survey      Responses   [facility] facility patient discharged from? [place]   Discharge diagnosis Covid 19 PNA   Does the patient have one of the following disease processes/diagnoses(primary or secondary)? COVID-19          Jade Bird RN

## 2021-12-29 NOTE — OUTREACH NOTE
COPD/PN Week 2 Survey      Responses   [facility] patient discharged from? [facility]   Does the patient have one of the following disease processes/diagnoses(primary or secondary)? COVID-19   Call start time 0925   Call end time 0929   Discharge diagnosis Covid 19 PNA   Meds reviewed with patient/caregiver? Yes   Is the patient having any side effects they believe may be caused by any medication additions or changes? No   Does the patient have all medications ordered at discharge? Yes   Is the patient taking all medications as directed (includes completed medication regime)? Yes   Does the patient have an appointment with their PCP or specialist within 7 days of discharge? Yes   Comments regarding PCP wednesday 12/29   Has the patient kept scheduled appointments due by today? Yes   Pulse Ox monitoring Intermittent   Pulse Ox device source Patient   O2 Sat comments 98%   O2 Sat: education provided Sat levels,  Monitoring frequency,  When to seek care   Psychosocial issues? No   Did the patient receive a copy of their discharge instructions? Yes   Nursing interventions Reviewed instructions with patient   What is the patient's perception of their health status since discharge? Improving   Nursing Interventions Nurse provided patient education   If the patient is a current smoker, are they able to teach back resources for cessation? Not a smoker   Is the patient/caregiver able to teach back the hierarchy of who to call/visit for symptoms/problems? PCP, Specialist, Home health nurse, Urgent Care, ED, 911 Yes   Wrap up additional comments pt stated he is doing much better. Has pcp appt today at 2          Jade Bird RN

## 2022-01-05 ENCOUNTER — READMISSION MANAGEMENT (OUTPATIENT)
Dept: CALL CENTER | Facility: HOSPITAL | Age: 72
End: 2022-01-05

## 2022-01-05 NOTE — OUTREACH NOTE
COVID-19 Week 3 Survey      Responses   [facility] patient discharged from? [facility]   Does the patient have one of the following disease processes/diagnoses(primary or secondary)? COVID-19   COVID-19 underlying condition? None   Call Number Call 1   COVID-19 Week 3: Call 1 attempt successful? Yes   Call start time 1137   Call end time 1143   Discharge diagnosis Covid 19 PNA   Meds reviewed with patient/caregiver? Yes   Is the patient taking all medications as directed (includes completed medication regime)? Yes   Comments regarding appointments Pt has followed up with pcp.   Does the patient have a primary care provider?  Yes   What DME was ordered? Pt continues to use 02 @ night.   What is the patient's perception of their health status since discharge? Improving   Pulse Ox monitoring Intermittent   COVID-19 call completed? Yes   Wrap up additional comments Pt improving. Pt has no questions at this time.          Jodee Taylor RN